# Patient Record
Sex: FEMALE | Race: WHITE | Employment: OTHER | URBAN - METROPOLITAN AREA
[De-identification: names, ages, dates, MRNs, and addresses within clinical notes are randomized per-mention and may not be internally consistent; named-entity substitution may affect disease eponyms.]

---

## 2017-11-08 DIAGNOSIS — J44.9 CHRONIC OBSTRUCTIVE PULMONARY DISEASE, UNSPECIFIED COPD TYPE (HCC): Primary | ICD-10-CM

## 2017-11-09 RX ORDER — ALBUTEROL SULFATE 90 UG/1
2 AEROSOL, METERED RESPIRATORY (INHALATION) EVERY 6 HOURS PRN
Qty: 1 INHALER | Refills: 3 | Status: SHIPPED | OUTPATIENT
Start: 2017-11-09 | End: 2018-02-13 | Stop reason: SDUPTHER

## 2017-11-10 ENCOUNTER — TELEPHONE (OUTPATIENT)
Dept: PULMONOLOGY | Age: 67
End: 2017-11-10

## 2017-12-13 ENCOUNTER — OFFICE VISIT (OUTPATIENT)
Dept: PULMONOLOGY | Age: 67
End: 2017-12-13

## 2017-12-13 VITALS
HEIGHT: 61 IN | TEMPERATURE: 97.4 F | DIASTOLIC BLOOD PRESSURE: 80 MMHG | WEIGHT: 162 LBS | SYSTOLIC BLOOD PRESSURE: 138 MMHG | BODY MASS INDEX: 30.58 KG/M2 | HEART RATE: 128 BPM | OXYGEN SATURATION: 94 %

## 2017-12-13 DIAGNOSIS — F17.200 SMOKING: ICD-10-CM

## 2017-12-13 DIAGNOSIS — J20.9 ACUTE BRONCHITIS, UNSPECIFIED ORGANISM: ICD-10-CM

## 2017-12-13 DIAGNOSIS — J44.9 CHRONIC OBSTRUCTIVE PULMONARY DISEASE, UNSPECIFIED COPD TYPE (HCC): Primary | ICD-10-CM

## 2017-12-13 PROCEDURE — 99214 OFFICE O/P EST MOD 30 MIN: CPT | Performed by: INTERNAL MEDICINE

## 2017-12-13 RX ORDER — ALBUTEROL SULFATE 2.5 MG/3ML
2.5 SOLUTION RESPIRATORY (INHALATION)
COMMUNITY
Start: 2011-04-28 | End: 2018-06-12 | Stop reason: SDUPTHER

## 2017-12-13 RX ORDER — PREDNISONE 10 MG/1
TABLET ORAL
Qty: 40 TABLET | Refills: 0 | Status: SHIPPED | OUTPATIENT
Start: 2017-12-13 | End: 2018-10-26 | Stop reason: ALTCHOICE

## 2017-12-13 RX ORDER — AMOXICILLIN AND CLAVULANATE POTASSIUM 875; 125 MG/1; MG/1
1 TABLET, FILM COATED ORAL 2 TIMES DAILY
Qty: 20 TABLET | Refills: 0 | Status: SHIPPED | OUTPATIENT
Start: 2017-12-13 | End: 2017-12-23

## 2017-12-13 ASSESSMENT — ENCOUNTER SYMPTOMS
COUGH: 1
VOICE CHANGE: 0
ABDOMINAL PAIN: 0
VOMITING: 0
NAUSEA: 0
EYE ITCHING: 0
DIARRHEA: 0
RHINORRHEA: 1
SHORTNESS OF BREATH: 1
CHEST TIGHTNESS: 0
SORE THROAT: 0
WHEEZING: 1
TROUBLE SWALLOWING: 0
EYE DISCHARGE: 0
SINUS PRESSURE: 0

## 2017-12-13 NOTE — PROGRESS NOTES
Subjective:     Chelsi Hopkins is a 75 y.o. female who complains today of:     Chief Complaint   Patient presents with    Follow-up     6 month follow up       HPI  Patient is using nebulizer with albuterol and proair HFA , she said she is not using anoro  C/o shortness of breath Which is more than his usual.  Off and on Wheezing   C/o Cough with white Sputum and having chest congestion. She still smoking 1 pack a day  No Hemoptysis  No Chest pain or pleuritic pain  No Fever or chills. C/o Rhinorrhea no  postnasal drip. Allergies:  Review of patient's allergies indicates no known allergies. Past Medical History:   Diagnosis Date    [facility] [address]     [facility]      History reviewed. No pertinent surgical history. Family History   Problem Relation Age of Onset    Brain Cancer Father      Social History     Social History    Marital status: Single     Spouse name: N/A    Number of children: N/A    Years of education: N/A     Occupational History    Not on file. Social History Main Topics    Smoking status: Current Every Day Smoker     Packs/day: 1.00    Smokeless tobacco: Never Used    Alcohol use Yes      Comment: social    Drug use: No    Sexual activity: Not on file     Other Topics Concern    Not on file     Social History Narrative    No narrative on file         Review of Systems   Constitutional: Negative for chills, diaphoresis, fatigue and fever. HENT: Positive for rhinorrhea. Negative for congestion, mouth sores, nosebleeds, postnasal drip, sinus pressure, sneezing, sore throat, trouble swallowing and voice change. Eyes: Negative for discharge, itching and visual disturbance. Respiratory: Positive for cough, shortness of breath and wheezing. Negative for chest tightness. Cardiovascular: Negative for chest pain, palpitations and leg swelling.    Gastrointestinal: Negative for abdominal pain, diarrhea, nausea and 1 Inhaler 3    albuterol sulfate HFA (PROAIR HFA) 108 (90 Base) MCG/ACT inhaler Inhale 2 puffs into the lungs every 6 hours as needed for Wheezing 1 Inhaler 3     No current facility-administered medications for this visit. Results for orders placed during the hospital encounter of 11/12/16   XR CHEST STANDARD TWO VW    Narrative EXAMINATION: XR CHEST STANDARD TWO VW  CLINICAL HISTORY: R05  COMPARISONS: August 10, 2015  FINDINGS: Frontal and lateral views of the chest were obtained showing new, small perihilar atelectasis on the left. There are signs of underlying COPD. The heart is not enlarged. Mediastinum is not widened. There is no pleural effusion. There is no   dense consolidation. There are no lung masses. There are no worrisome nodules. CONCLUSION: NEW SMALL ATELECTASIS ON THE LEFT     Assessment/Plan:     1. Chronic obstructive pulmonary disease, unspecified COPD type ([address]  Patient is using nebulizer with albuterol and ProAir HFA 4 times a day when necessary basis she is not using Anoro, patient said he is not covered. I'll start her on Bevespi 2 puff BID, sample given. She is having cough with whitish mucus but  have more wheezing and short of breath. Patient does not wants to have a PFT done, chest x-ray requested. 2. Smoking  Patient advised try to quit smoking. Risks related to smoking explained to the patient. 3. Acute bronchitis, unspecified organism  Patient's will be started on Augmentin and taper dose of prednisone. Return in about 6 months (around 6/13/2018) for COPD.       Jessica Boucher MD

## 2018-02-09 DIAGNOSIS — J44.9 CHRONIC OBSTRUCTIVE PULMONARY DISEASE, UNSPECIFIED COPD TYPE (HCC): Primary | ICD-10-CM

## 2018-02-13 ENCOUNTER — TELEPHONE (OUTPATIENT)
Dept: PULMONOLOGY | Age: 68
End: 2018-02-13

## 2018-02-13 DIAGNOSIS — J44.9 CHRONIC OBSTRUCTIVE PULMONARY DISEASE, UNSPECIFIED COPD TYPE (HCC): ICD-10-CM

## 2018-02-13 RX ORDER — ALBUTEROL SULFATE 90 UG/1
2 AEROSOL, METERED RESPIRATORY (INHALATION) EVERY 6 HOURS PRN
Qty: 1 INHALER | Refills: 3 | Status: SHIPPED | OUTPATIENT
Start: 2018-02-13 | End: 2018-05-14 | Stop reason: SDUPTHER

## 2018-05-14 DIAGNOSIS — J44.9 CHRONIC OBSTRUCTIVE PULMONARY DISEASE, UNSPECIFIED COPD TYPE (HCC): ICD-10-CM

## 2018-06-06 ENCOUNTER — HOSPITAL ENCOUNTER (OUTPATIENT)
Dept: GENERAL RADIOLOGY | Age: 68
Discharge: HOME | End: 2018-06-08
Payer: COMMERCIAL

## 2018-06-06 DIAGNOSIS — J44.9 CHRONIC OBSTRUCTIVE PULMONARY DISEASE, UNSPECIFIED COPD TYPE (HCC): ICD-10-CM

## 2018-06-06 PROCEDURE — 71046 X-RAY EXAM CHEST 2 VIEWS: CPT

## 2018-06-12 ENCOUNTER — OFFICE VISIT (OUTPATIENT)
Dept: PULMONOLOGY | Age: 68
End: 2018-06-12
Payer: COMMERCIAL

## 2018-06-12 VITALS
DIASTOLIC BLOOD PRESSURE: 70 MMHG | WEIGHT: 164.8 LBS | SYSTOLIC BLOOD PRESSURE: 110 MMHG | BODY MASS INDEX: 31.11 KG/M2 | HEIGHT: 61 IN | HEART RATE: 105 BPM | OXYGEN SATURATION: 94 % | TEMPERATURE: 97.1 F

## 2018-06-12 DIAGNOSIS — F17.200 SMOKING: ICD-10-CM

## 2018-06-12 DIAGNOSIS — J44.9 CHRONIC OBSTRUCTIVE PULMONARY DISEASE, UNSPECIFIED COPD TYPE (HCC): Primary | ICD-10-CM

## 2018-06-12 DIAGNOSIS — E66.9 OBESITY (BMI 30-39.9): ICD-10-CM

## 2018-06-12 PROCEDURE — 99214 OFFICE O/P EST MOD 30 MIN: CPT | Performed by: INTERNAL MEDICINE

## 2018-06-12 RX ORDER — ALBUTEROL SULFATE 2.5 MG/3ML
2.5 SOLUTION RESPIRATORY (INHALATION) EVERY 6 HOURS PRN
Qty: 120 EACH | Refills: 5 | Status: SHIPPED | OUTPATIENT
Start: 2018-06-12 | End: 2021-12-20 | Stop reason: SDUPTHER

## 2018-06-12 ASSESSMENT — ENCOUNTER SYMPTOMS
CHEST TIGHTNESS: 0
SORE THROAT: 0
RHINORRHEA: 0
EYE DISCHARGE: 0
VOICE CHANGE: 0
WHEEZING: 1
SHORTNESS OF BREATH: 1
TROUBLE SWALLOWING: 0
DIARRHEA: 0
NAUSEA: 0
ABDOMINAL PAIN: 0
VOMITING: 0
EYE ITCHING: 0
COUGH: 1
SINUS PRESSURE: 0

## 2018-07-25 DIAGNOSIS — J44.9 CHRONIC OBSTRUCTIVE PULMONARY DISEASE, UNSPECIFIED COPD TYPE (HCC): ICD-10-CM

## 2018-07-27 DIAGNOSIS — J44.9 CHRONIC OBSTRUCTIVE PULMONARY DISEASE, UNSPECIFIED COPD TYPE (HCC): ICD-10-CM

## 2018-10-24 DIAGNOSIS — J44.9 CHRONIC OBSTRUCTIVE PULMONARY DISEASE, UNSPECIFIED COPD TYPE (HCC): ICD-10-CM

## 2018-10-25 RX ORDER — ALBUTEROL SULFATE 90 UG/1
2 AEROSOL, METERED RESPIRATORY (INHALATION) EVERY 6 HOURS PRN
Qty: 8.5 INHALER | Refills: 3 | Status: SHIPPED | OUTPATIENT
Start: 2018-10-25 | End: 2019-01-15 | Stop reason: SDUPTHER

## 2018-10-26 ENCOUNTER — OFFICE VISIT (OUTPATIENT)
Dept: PULMONOLOGY | Age: 68
End: 2018-10-26
Payer: COMMERCIAL

## 2018-10-26 VITALS
RESPIRATION RATE: 16 BRPM | BODY MASS INDEX: 30.58 KG/M2 | HEIGHT: 61 IN | WEIGHT: 162 LBS | DIASTOLIC BLOOD PRESSURE: 70 MMHG | SYSTOLIC BLOOD PRESSURE: 132 MMHG | OXYGEN SATURATION: 93 % | HEART RATE: 118 BPM | TEMPERATURE: 97.9 F

## 2018-10-26 DIAGNOSIS — Z72.0 TOBACCO ABUSE: ICD-10-CM

## 2018-10-26 DIAGNOSIS — J44.9 CHRONIC OBSTRUCTIVE PULMONARY DISEASE, UNSPECIFIED COPD TYPE (HCC): Primary | ICD-10-CM

## 2018-10-26 DIAGNOSIS — J42 CHRONIC BRONCHITIS, UNSPECIFIED CHRONIC BRONCHITIS TYPE (HCC): ICD-10-CM

## 2018-10-26 PROCEDURE — 99214 OFFICE O/P EST MOD 30 MIN: CPT | Performed by: INTERNAL MEDICINE

## 2018-10-26 ASSESSMENT — ENCOUNTER SYMPTOMS
SINUS PRESSURE: 0
SHORTNESS OF BREATH: 1
COUGH: 1
VOICE CHANGE: 0
RHINORRHEA: 0
CHEST TIGHTNESS: 0
WHEEZING: 1
DIARRHEA: 0
VOMITING: 0
EYE ITCHING: 0
ABDOMINAL PAIN: 0
EYE DISCHARGE: 0
SORE THROAT: 0
TROUBLE SWALLOWING: 0
NAUSEA: 0

## 2018-10-26 NOTE — PROGRESS NOTES
Subjective:     Nicolás Marcelo is a 70 y.o. female who complains today of:     Chief Complaint   Patient presents with    Follow-up     four month f/u for COPD. HPI  Patient is smoking less than 1 ppd  C/o shortness of breath  with exertion. Off and on Wheezing   Cough with white to brown  Sputum  No Hemoptysis  No Chest tightness   No Chest pain with radiation  or pleuritic pain  No Fever or chills. No Rhinorrhea and postnasal drip. She has occasional hear burn , takes Tums  Using bronchodilator with stiolto 2 puff in AM ,she is also using nebulizer with albuterol and albuterol prn use. Allergies:  Patient has no known allergies. Past Medical History:   Diagnosis Date    [facility]     [facility] (chronic obstructive pulmonary disease) ([facility]      History reviewed. No pertinent surgical history. Family History   Problem Relation Age of Onset    Brain Cancer Father      Social History     Social History    Marital status: Single     Spouse name: N/A    Number of children: N/A    Years of education: N/A     Occupational History    Not on file. Social History Main Topics    Smoking status: Current Every Day Smoker     Packs/day: 1.00    Smokeless tobacco: Never Used    Alcohol use Yes      Comment: social    Drug use: No    Sexual activity: Not on file     Other Topics Concern    Not on file     Social History Narrative    No narrative on file         Review of Systems   Constitutional: Negative for chills, diaphoresis, fatigue and fever. HENT: Negative for congestion, mouth sores, nosebleeds, postnasal drip, rhinorrhea, sinus pressure, sneezing, sore throat, trouble swallowing and voice change. Eyes: Negative for discharge, itching and visual disturbance. Respiratory: Positive for cough, shortness of breath and wheezing. Negative for chest tightness. Cardiovascular: Negative for chest pain, palpitations and leg swelling.    Gastrointestinal: Negative for abdominal Rachid Iglesias MD

## 2019-01-15 DIAGNOSIS — J44.9 CHRONIC OBSTRUCTIVE PULMONARY DISEASE, UNSPECIFIED COPD TYPE (HCC): ICD-10-CM

## 2019-01-15 RX ORDER — ALBUTEROL SULFATE 90 UG/1
2 AEROSOL, METERED RESPIRATORY (INHALATION) EVERY 6 HOURS PRN
Qty: 8.5 INHALER | Refills: 3 | Status: CANCELLED | OUTPATIENT
Start: 2019-01-15

## 2019-01-15 RX ORDER — ALBUTEROL SULFATE 90 UG/1
2 AEROSOL, METERED RESPIRATORY (INHALATION) EVERY 6 HOURS PRN
Qty: 8.5 INHALER | Refills: 3 | Status: SHIPPED | OUTPATIENT
Start: 2019-01-15 | End: 2019-02-08 | Stop reason: SDUPTHER

## 2019-03-05 ENCOUNTER — OFFICE VISIT (OUTPATIENT)
Dept: PULMONOLOGY | Age: 69
End: 2019-03-05
Payer: COMMERCIAL

## 2019-03-05 VITALS
BODY MASS INDEX: 30.21 KG/M2 | SYSTOLIC BLOOD PRESSURE: 126 MMHG | HEIGHT: 61 IN | OXYGEN SATURATION: 93 % | HEART RATE: 107 BPM | RESPIRATION RATE: 16 BRPM | WEIGHT: 160 LBS | DIASTOLIC BLOOD PRESSURE: 78 MMHG | TEMPERATURE: 97.7 F

## 2019-03-05 DIAGNOSIS — J44.9 CHRONIC OBSTRUCTIVE PULMONARY DISEASE, UNSPECIFIED COPD TYPE (HCC): Primary | ICD-10-CM

## 2019-03-05 DIAGNOSIS — Z72.0 TOBACCO ABUSE DISORDER: ICD-10-CM

## 2019-03-05 PROCEDURE — 99214 OFFICE O/P EST MOD 30 MIN: CPT | Performed by: INTERNAL MEDICINE

## 2019-03-05 RX ORDER — ALBUTEROL SULFATE 90 UG/1
2 AEROSOL, METERED RESPIRATORY (INHALATION) 4 TIMES DAILY PRN
Qty: 1 INHALER | Refills: 5 | Status: SHIPPED | OUTPATIENT
Start: 2019-03-05 | End: 2019-09-06 | Stop reason: SDUPTHER

## 2019-03-05 ASSESSMENT — ENCOUNTER SYMPTOMS
DIARRHEA: 0
NAUSEA: 0
VOMITING: 0
EYE ITCHING: 0
WHEEZING: 1
COUGH: 1
SORE THROAT: 0
VOICE CHANGE: 0
SINUS PRESSURE: 0
EYE DISCHARGE: 0
SHORTNESS OF BREATH: 1
TROUBLE SWALLOWING: 0
ABDOMINAL PAIN: 0
RHINORRHEA: 0
CHEST TIGHTNESS: 0

## 2019-04-08 ENCOUNTER — TELEPHONE (OUTPATIENT)
Dept: PULMONOLOGY | Age: 69
End: 2019-04-08

## 2019-05-01 DIAGNOSIS — J44.9 CHRONIC OBSTRUCTIVE PULMONARY DISEASE, UNSPECIFIED COPD TYPE (HCC): ICD-10-CM

## 2019-05-24 ENCOUNTER — TELEPHONE (OUTPATIENT)
Dept: PULMONOLOGY | Age: 69
End: 2019-05-24

## 2019-05-24 NOTE — TELEPHONE ENCOUNTER
PT CALLED STATING THAT STIOLTO IS NOT COVERED BY INSURANCE, SHE TRIED [employer] BUT DID NOT WORK FOR HER. INSURANCE WILL COVERED COMBIVENT AND BREO. PLEASE ADVICE.

## 2019-08-24 ENCOUNTER — HOSPITAL ENCOUNTER (OUTPATIENT)
Dept: GENERAL RADIOLOGY | Age: 69
Discharge: HOME | End: 2019-08-26
Payer: COMMERCIAL

## 2019-08-24 DIAGNOSIS — J44.9 CHRONIC OBSTRUCTIVE PULMONARY DISEASE, UNSPECIFIED COPD TYPE (HCC): ICD-10-CM

## 2019-08-24 PROCEDURE — 71046 X-RAY EXAM CHEST 2 VIEWS: CPT

## 2019-09-06 ENCOUNTER — OFFICE VISIT (OUTPATIENT)
Dept: PULMONOLOGY | Age: 69
End: 2019-09-06
Payer: COMMERCIAL

## 2019-09-06 VITALS
HEIGHT: 61 IN | TEMPERATURE: 97.3 F | SYSTOLIC BLOOD PRESSURE: 120 MMHG | WEIGHT: 160 LBS | BODY MASS INDEX: 30.21 KG/M2 | DIASTOLIC BLOOD PRESSURE: 80 MMHG | OXYGEN SATURATION: 92 % | RESPIRATION RATE: 16 BRPM | HEART RATE: 106 BPM

## 2019-09-06 DIAGNOSIS — R09.02 HYPOXIA: ICD-10-CM

## 2019-09-06 DIAGNOSIS — Z72.0 TOBACCO ABUSE DISORDER: ICD-10-CM

## 2019-09-06 DIAGNOSIS — J44.9 CHRONIC OBSTRUCTIVE PULMONARY DISEASE, UNSPECIFIED COPD TYPE (HCC): Primary | ICD-10-CM

## 2019-09-06 PROCEDURE — 99214 OFFICE O/P EST MOD 30 MIN: CPT | Performed by: INTERNAL MEDICINE

## 2019-09-06 RX ORDER — PREDNISONE 10 MG/1
TABLET ORAL
Qty: 40 TABLET | Refills: 0 | Status: SHIPPED | OUTPATIENT
Start: 2019-09-06 | End: 2020-07-27 | Stop reason: ALTCHOICE

## 2019-09-06 RX ORDER — ALBUTEROL SULFATE 90 UG/1
2 AEROSOL, METERED RESPIRATORY (INHALATION) 4 TIMES DAILY PRN
Qty: 3 INHALER | Refills: 1 | Status: SHIPPED | OUTPATIENT
Start: 2019-09-06 | End: 2020-02-20

## 2019-09-06 RX ORDER — AZITHROMYCIN 500 MG/1
500 TABLET, FILM COATED ORAL DAILY
Qty: 1 PACKET | Refills: 0 | Status: SHIPPED | OUTPATIENT
Start: 2019-09-06 | End: 2019-09-09

## 2019-09-06 ASSESSMENT — ENCOUNTER SYMPTOMS
SHORTNESS OF BREATH: 1
SINUS PRESSURE: 0
ABDOMINAL PAIN: 0
TROUBLE SWALLOWING: 0
VOMITING: 0
COUGH: 1
DIARRHEA: 0
RHINORRHEA: 0
WHEEZING: 1
EYE ITCHING: 0
EYE DISCHARGE: 0
CHEST TIGHTNESS: 0
SORE THROAT: 0
VOICE CHANGE: 0
NAUSEA: 0

## 2019-09-06 NOTE — PROGRESS NOTES
Active member of club or organization: Not on file     Attends meetings of clubs or organizations: Not on file     Relationship status: Not on file    Intimate partner violence:     Fear of current or ex partner: Not on file     Emotionally abused: Not on file     Physically abused: Not on file     Forced sexual activity: Not on file   Other Topics Concern    Not on file   Social History Narrative    Not on file         Review of Systems   Constitutional: Negative for chills, diaphoresis, fatigue and fever. HENT: Negative for congestion, mouth sores, nosebleeds, postnasal drip, rhinorrhea, sinus pressure, sneezing, sore throat, trouble swallowing and voice change. Eyes: Negative for discharge, itching and visual disturbance. Respiratory: Positive for cough, shortness of breath and wheezing. Negative for chest tightness. Cardiovascular: Negative for chest pain, palpitations and leg swelling. Gastrointestinal: Negative for abdominal pain, diarrhea, nausea and vomiting. Genitourinary: Negative for difficulty urinating and hematuria. Musculoskeletal: Negative for arthralgias, joint swelling and myalgias. Skin: Negative for rash. Allergic/Immunologic: Negative for environmental allergies and food allergies. Neurological: Negative for dizziness, tremors, weakness and headaches. Psychiatric/Behavioral: Negative for behavioral problems and sleep disturbance. Objective:     Vitals:    09/06/19 0836 09/06/19 0843   BP: 120/80    Pulse: 106    Resp: 16    Temp: 97.3 °F (36.3 °C)    TempSrc: Tympanic    SpO2: (!) 86% 92%   Weight: 160 lb (72.6 kg)    Height: 5' 1\" (1.549 m)          Physical Exam   Constitutional: She is oriented to person, place, and time. She appears well-developed and well-nourished. No distress. HENT:   Head: Normocephalic and atraumatic. Nose: Nose normal.   Mouth/Throat: Oropharynx is clear and moist.   Eyes: Pupils are equal, round, and reactive to light.  EOM and soft tissues intact. No change from June 6, 2018. Impression NO ACTIVE DISEASE IN THE CHEST. Assessment/Plan:     1. Chronic obstructive pulmonary disease, unspecified COPD type ([address]  Patient is having short of breath with exertion. She thinks shortness of breath is somewhat more than last few days and having wheezing. and cough with creamy sputum. Chest x-ray done shows no active infiltration. using combivent respimat 1 puff QID she is also using nebulizer with albuterol and albuterol prn use.  pro air HFA. She will be started on taper dose of prednisone and p.o. antibiotic. - predniSONE (DELTASONE) 10 MG tablet; 4 tablets daily for 4 days, 3 tablets daily for 4 days, 2 tablets daily for 4 days, then 1 tablet daily for 4 days  Dispense: 40 tablet; Refill: 0  - azithromycin (ZITHROMAX) 500 MG tablet; Take 1 tablet by mouth daily for 3 days  Dispense: 1 packet; Refill: 0    2. Tobacco abuse disorder  Patient advised try to quit smoking. Risks related to smoking explained to the patient. Different ways to help him quit smoking were discussed today. Explained about CT chest for lung screening. She refused to have a CT chest done    3. Hypoxia  He has low O2 saturation 82% on room which improved to 92% with few minute of rest.  I explained patient     Return in about 6 months (around 3/6/2020) for COPD.       Delmar Martin MD

## 2019-10-18 ENCOUNTER — TELEPHONE (OUTPATIENT)
Dept: PULMONOLOGY | Age: 69
End: 2019-10-18

## 2019-10-19 RX ORDER — PREDNISONE 10 MG/1
TABLET ORAL
Qty: 40 TABLET | Refills: 0 | Status: SHIPPED | OUTPATIENT
Start: 2019-10-19 | End: 2020-07-27 | Stop reason: ALTCHOICE

## 2019-12-03 ENCOUNTER — TELEPHONE (OUTPATIENT)
Dept: PULMONOLOGY | Age: 69
End: 2019-12-03

## 2019-12-03 DIAGNOSIS — J44.9 CHRONIC OBSTRUCTIVE PULMONARY DISEASE, UNSPECIFIED COPD TYPE (HCC): Primary | ICD-10-CM

## 2019-12-03 RX ORDER — PREDNISONE 10 MG/1
TABLET ORAL
Qty: 40 TABLET | Refills: 0 | Status: SHIPPED | OUTPATIENT
Start: 2019-12-03 | End: 2020-07-27 | Stop reason: ALTCHOICE

## 2019-12-03 RX ORDER — AZITHROMYCIN 250 MG/1
250 TABLET, FILM COATED ORAL SEE ADMIN INSTRUCTIONS
Qty: 6 TABLET | Refills: 0 | Status: SHIPPED | OUTPATIENT
Start: 2019-12-03 | End: 2019-12-08

## 2020-02-20 RX ORDER — ALBUTEROL SULFATE 90 UG/1
2 AEROSOL, METERED RESPIRATORY (INHALATION) 4 TIMES DAILY PRN
Qty: 6.7 INHALER | Refills: 5 | Status: SHIPPED | OUTPATIENT
Start: 2020-02-20 | End: 2020-07-27 | Stop reason: SDUPTHER

## 2020-03-19 ENCOUNTER — TELEPHONE (OUTPATIENT)
Dept: PULMONOLOGY | Age: 70
End: 2020-03-19

## 2020-03-19 NOTE — TELEPHONE ENCOUNTER
Patient was given Ipratropium bromide albuterol inhalation solution after discharge from [facility] and would like a refill on it.  It's not on medication list. Please Advise [facility] ann-marie Nuno

## 2020-03-24 ENCOUNTER — VIRTUAL VISIT (OUTPATIENT)
Dept: PULMONOLOGY | Age: 70
End: 2020-03-24
Payer: COMMERCIAL

## 2020-03-24 PROCEDURE — 99214 OFFICE O/P EST MOD 30 MIN: CPT | Performed by: INTERNAL MEDICINE

## 2020-03-24 RX ORDER — IPRATROPIUM BROMIDE AND ALBUTEROL SULFATE 2.5; .5 MG/3ML; MG/3ML
1 SOLUTION RESPIRATORY (INHALATION) EVERY 4 HOURS
Qty: 120 ML | Refills: 3 | Status: SHIPPED | OUTPATIENT
Start: 2020-03-24 | End: 2020-07-27 | Stop reason: SDUPTHER

## 2020-03-24 ASSESSMENT — ENCOUNTER SYMPTOMS
SINUS PRESSURE: 0
TROUBLE SWALLOWING: 0
EYE DISCHARGE: 0
RHINORRHEA: 0
ABDOMINAL PAIN: 0
VOMITING: 0
DIARRHEA: 0
WHEEZING: 0
VOICE CHANGE: 0
COUGH: 1
NAUSEA: 0
EYE ITCHING: 0
SORE THROAT: 0
SHORTNESS OF BREATH: 0
CHEST TIGHTNESS: 0

## 2020-03-24 NOTE — PROGRESS NOTES
10 MG tablet 4 tablets daily for 4 days, 3 tablets daily for 4 days, 2 tablets daily for 4 days, then 1 tablet daily for 4 days 40 tablet 0    predniSONE (DELTASONE) 10 MG tablet 4 tablets daily for 4 days, 3 tablets daily for 4 days, 2 tablets daily for 4 days, then 1 tablet daily for 4 days 40 tablet 0    predniSONE (DELTASONE) 10 MG tablet 4 tablets daily for 4 days, 3 tablets daily for 4 days, 2 tablets daily for 4 days, then 1 tablet daily for 4 days 40 tablet 0    albuterol (PROVENTIL) (2.5 MG/3ML) 0.083% nebulizer solution Take 3 mLs by nebulization every 6 hours as needed for Wheezing 120 each 5     No current facility-administered medications for this visit. Results for orders placed during the hospital encounter of 08/24/19   XR CHEST STANDARD (2 VW)    Narrative EXAMINATION: XR CHEST (2 VW)    REASON FOR EXAM:J44.9 Chronic obstructive pulmonary disease, unspecified COPD type ([address] ICD10    FINDINGS: Plain films reveal heart and mediastinal structures within normal limits and the lung fields free of active disease. Bones and soft tissues intact. No change from June 6, 2018. Impression NO ACTIVE DISEASE IN THE CHEST. Assessment/Plan:     1. Chronic obstructive pulmonary disease, unspecified COPD type ([address]  She  is using nebulizer albuterol and ipatropium, combivent respimat, albuterol HFANo C/o shortness of breath with exertion. No Wheezing. C/o Cough with clear Sputum. She said she is not using oxygen anymore. She wants refill on nebulizer solution. - ipratropium-albuterol (DUONEB) 0.5-2.5 (3) MG/3ML SOLN nebulizer solution; Inhale 3 mLs into the lungs every 4 hours  Dispense: 120 mL; Refill: 3    2. Tobacco abuse disorder  Patient advised try to quit smoking. Risks related to smoking explained to the patient. Different ways to help him quit smoking were discussed today.     Patient notified that this is a billable service and has given verbal consent for telehealth services. Time spent with patient 24  minutes. Return in about 3 months (around 6/24/2020) for COPD.       Ole Prasad MD

## 2020-06-26 RX ORDER — ALBUTEROL SULFATE 90 UG/1
2 AEROSOL, METERED RESPIRATORY (INHALATION) 4 TIMES DAILY PRN
Qty: 6.7 INHALER | Refills: 5 | OUTPATIENT
Start: 2020-06-26

## 2020-06-26 RX ORDER — IPRATROPIUM BROMIDE AND ALBUTEROL SULFATE 2.5; .5 MG/3ML; MG/3ML
1 SOLUTION RESPIRATORY (INHALATION) EVERY 4 HOURS
Qty: 120 ML | Refills: 3 | OUTPATIENT
Start: 2020-06-26

## 2020-07-27 ENCOUNTER — OFFICE VISIT (OUTPATIENT)
Dept: PULMONOLOGY | Age: 70
End: 2020-07-27
Payer: COMMERCIAL

## 2020-07-27 VITALS
WEIGHT: 158 LBS | OXYGEN SATURATION: 92 % | BODY MASS INDEX: 29.83 KG/M2 | HEIGHT: 61 IN | HEART RATE: 117 BPM | TEMPERATURE: 97.3 F | DIASTOLIC BLOOD PRESSURE: 74 MMHG | RESPIRATION RATE: 16 BRPM | SYSTOLIC BLOOD PRESSURE: 112 MMHG

## 2020-07-27 PROCEDURE — 99214 OFFICE O/P EST MOD 30 MIN: CPT | Performed by: INTERNAL MEDICINE

## 2020-07-27 RX ORDER — IPRATROPIUM BROMIDE AND ALBUTEROL SULFATE 2.5; .5 MG/3ML; MG/3ML
1 SOLUTION RESPIRATORY (INHALATION) EVERY 4 HOURS
Qty: 120 ML | Refills: 3 | Status: SHIPPED | OUTPATIENT
Start: 2020-07-27 | End: 2020-09-11 | Stop reason: SDUPTHER

## 2020-07-27 RX ORDER — ALBUTEROL SULFATE 90 UG/1
2 AEROSOL, METERED RESPIRATORY (INHALATION) 4 TIMES DAILY PRN
Qty: 6.7 INHALER | Refills: 5 | Status: SHIPPED | OUTPATIENT
Start: 2020-07-27 | End: 2020-09-11 | Stop reason: SDUPTHER

## 2020-07-27 ASSESSMENT — ENCOUNTER SYMPTOMS
RHINORRHEA: 0
COUGH: 1
SORE THROAT: 0
SINUS PRESSURE: 0
EYE DISCHARGE: 0
ABDOMINAL PAIN: 0
VOMITING: 0
CHEST TIGHTNESS: 0
SHORTNESS OF BREATH: 1
EYE ITCHING: 0
VOICE CHANGE: 0
WHEEZING: 0
DIARRHEA: 0
NAUSEA: 0
TROUBLE SWALLOWING: 0

## 2020-07-27 NOTE — PROGRESS NOTES
Subjective:     Joe Concepcion is a 69 y.o. female who complains today of:     Chief Complaint   Patient presents with    Follow-up     three month f/u for COPD. HPI  C/o mild shortness of breath  with exertion, hot humid weather. No Wheezing   C/o Cough with white  Sputum  No Hemoptysis  No Chest tightness   No Chest pain with radiation  or pleuritic pain  No Fever or chills. No Rhinorrhea and postnasal drip. Using bronchodilator with nebulizer albuterol and ipatropium, combivent respimat, albuterol HFA  She is still smoking 1 ppd        Allergies:  Patient has no known allergies. Past Medical History:   Diagnosis Date    Chronic bronchitis (HCC)     [facility] [address]     Lung disease      No past surgical history on file.   Family History   Problem Relation Age of Onset    Brain Cancer Father      Social History     Socioeconomic History    Marital status: Single     Spouse name: Not on file    Number of children: Not on file    Years of education: Not on file    Highest education level: Not on file   Occupational History    Not on file   Social Needs    Financial resource strain: Not on file    Food insecurity     Worry: Not on file     Inability: Not on file    Transportation needs     Medical: Not on file     Non-medical: Not on file   Tobacco Use    Smoking status: Current Every Day Smoker     Packs/day: 1.00    Smokeless tobacco: Never Used   Substance and Sexual Activity    Alcohol use: Yes     Comment: social    Drug use: No    Sexual activity: Not on file   Lifestyle    Physical activity     Days per week: Not on file     Minutes per session: Not on file    Stress: Not on file   Relationships    Social connections     Talks on phone: Not on file     Gets together: Not on file     Attends [employer] service: Not on file     Active member of club or organization: Not on file     Attends meetings of clubs or organizations: Not on file Thyroid: No thyromegaly. Vascular: No JVD. Trachea: No tracheal deviation. Cardiovascular:      Rate and Rhythm: Normal rate and regular rhythm. Heart sounds: No murmur. No friction rub. No gallop. Pulmonary:      Effort: No respiratory distress. Breath sounds: No wheezing or rales. Comments: diminished Breath sound bilaterally. Chest:      Chest wall: No tenderness. Abdominal:      General: There is no distension. Tenderness: There is no abdominal tenderness. There is no rebound. Musculoskeletal: Normal range of motion. Lymphadenopathy:      Cervical: No cervical adenopathy. Skin:     General: Skin is warm and dry. Neurological:      Mental Status: She is alert and oriented to person, place, and time. Coordination: Coordination normal.         Current Outpatient Medications   Medication Sig Dispense Refill    albuterol-ipratropium (COMBIVENT RESPIMAT)  MCG/ACT AERS inhaler Inhale 1 puff into the lungs every 6 hours 3 Inhaler 1    ipratropium-albuterol (DUONEB) 0.5-2.5 (3) MG/3ML SOLN nebulizer solution Inhale 3 mLs into the lungs every 4 hours 120 mL 3    albuterol sulfate  (90 Base) MCG/ACT inhaler Inhale 2 puffs into the lungs 4 times daily as needed for Wheezing 6.7 Inhaler 5    albuterol (PROVENTIL) (2.5 MG/3ML) 0.083% nebulizer solution Take 3 mLs by nebulization every 6 hours as needed for Wheezing 120 each 5     No current facility-administered medications for this visit. Results for orders placed during the hospital encounter of 08/24/19   XR CHEST STANDARD (2 VW)    Narrative EXAMINATION: XR CHEST (2 VW)    REASON FOR EXAM:J44.9 Chronic obstructive pulmonary disease, unspecified COPD type ([facility] [address] ICD10    FINDINGS: Plain films reveal heart and mediastinal structures within normal limits and the lung fields free of active disease. Bones and soft tissues intact. No change from June 6, 2018.        Impression NO ACTIVE DISEASE IN THE CHEST.        Assessment/Plan:     1. Chronic obstructive pulmonary disease, unspecified COPD type ([facility] [address]  C/o mild shortness of breath  with exertion, hot humid weather. No Wheezing   C/o Cough with white  Sputum. Using bronchodilator with nebulizer albuterol and ipatropium, combivent respimat, albuterol HFA. continue bronchodilator therapy as before. - Spirometry With Bronchodilator; Future  - XR CHEST STANDARD (2 VW); Future    - albuterol-ipratropium (COMBIVENT RESPIMAT)  MCG/ACT AERS inhaler; Inhale 1 puff into the lungs every 6 hours  Dispense: 3 Inhaler; Refill: 1  - ipratropium-albuterol (DUONEB) 0.5-2.5 (3) MG/3ML SOLN nebulizer solution; Inhale 3 mLs into the lungs every 4 hours  Dispense: 120 mL; Refill: 3    2. Tobacco abuse disorder  Patient advised try to quit smoking. Risks related to smoking explained to the patient. Different ways to help him quit smoking were discussed today. She is still smoking 1 ppd.    3. Hypoxia  She has O2 sat 88% qualify for O2 . Refuse O2    Return in about 3 months (around 10/27/2020) for COPD.       Anson Ambrose MD

## 2020-08-20 PROCEDURE — 71046 X-RAY EXAM CHEST 2 VIEWS: CPT

## 2020-08-21 ENCOUNTER — HOSPITAL ENCOUNTER (OUTPATIENT)
Dept: GENERAL RADIOLOGY | Age: 70
Discharge: HOME | End: 2020-08-22
Payer: COMMERCIAL

## 2020-08-21 PROCEDURE — 71046 X-RAY EXAM CHEST 2 VIEWS: CPT

## 2020-09-11 ENCOUNTER — HOSPITAL ENCOUNTER (OUTPATIENT)
Dept: PULMONOLOGY | Age: 70
Discharge: HOME | End: 2020-09-11
Payer: MEDICARE

## 2020-09-11 PROCEDURE — 94060 EVALUATION OF WHEEZING: CPT

## 2020-09-11 PROCEDURE — 6360000002 HC RX W HCPCS: Performed by: INTERNAL MEDICINE

## 2020-09-11 PROCEDURE — 94060 EVALUATION OF WHEEZING: CPT | Performed by: INTERNAL MEDICINE

## 2020-09-11 RX ORDER — IPRATROPIUM BROMIDE AND ALBUTEROL SULFATE 2.5; .5 MG/3ML; MG/3ML
1 SOLUTION RESPIRATORY (INHALATION) EVERY 4 HOURS
Qty: 120 ML | Refills: 3 | Status: SHIPPED | OUTPATIENT
Start: 2020-09-11 | End: 2021-08-02 | Stop reason: SDUPTHER

## 2020-09-11 RX ORDER — ALBUTEROL SULFATE 2.5 MG/3ML
2.5 SOLUTION RESPIRATORY (INHALATION) ONCE
Status: COMPLETED | OUTPATIENT
Start: 2020-09-11 | End: 2020-09-11

## 2020-09-11 RX ORDER — ALBUTEROL SULFATE 90 UG/1
2 AEROSOL, METERED RESPIRATORY (INHALATION) 4 TIMES DAILY PRN
Qty: 6.7 INHALER | Refills: 5 | Status: SHIPPED | OUTPATIENT
Start: 2020-09-11 | End: 2021-06-01

## 2020-09-11 RX ADMIN — ALBUTEROL SULFATE 2.5 MG: 2.5 SOLUTION RESPIRATORY (INHALATION) at 08:42

## 2020-09-15 NOTE — PROCEDURES
Spirometry only was done on this 69year-old patient who's height is 60 inches and weight is 158 pounds with 53-year smoking history    Indications  Dyspnea and cough    Spirometry  FVC is 1.23 liters which is 49 % of predicted, FEV1 is 0.89 liters which is 47 % of predicted, FEV1/FVC ratio is 72% which is mildly decreased  Flow volume loop suggest an obstructive pattern, FEF 25-75% is 0.52 L/Sec, which is 32 % of predicted  Following bronchodilator therapy forced vital capacity improved by 13%, FEV1 improved by 10%, FEF 25-75% improved by 57%      Overall impression  Study suggest at least moderate degree of obstructive impairment with mild reversibility after bronchodilators    Electronically signed by Katelin Linares MD, [facility] on 9/15/2020 at 10:01 AM

## 2020-10-26 ENCOUNTER — TELEPHONE (OUTPATIENT)
Dept: PULMONOLOGY | Age: 70
End: 2020-10-26

## 2020-10-26 NOTE — TELEPHONE ENCOUNTER
Patient called to request that her last RX order be resubmitted. As they say they haven't received her order. I' ts from September 18 I believe.

## 2020-10-29 ENCOUNTER — OFFICE VISIT (OUTPATIENT)
Dept: PULMONOLOGY | Age: 70
End: 2020-10-29
Payer: MEDICARE

## 2020-10-29 VITALS
SYSTOLIC BLOOD PRESSURE: 126 MMHG | BODY MASS INDEX: 28.51 KG/M2 | OXYGEN SATURATION: 90 % | DIASTOLIC BLOOD PRESSURE: 72 MMHG | RESPIRATION RATE: 16 BRPM | WEIGHT: 151 LBS | HEIGHT: 61 IN | HEART RATE: 114 BPM | TEMPERATURE: 97.1 F

## 2020-10-29 PROCEDURE — G8417 CALC BMI ABV UP PARAM F/U: HCPCS | Performed by: INTERNAL MEDICINE

## 2020-10-29 PROCEDURE — 4004F PT TOBACCO SCREEN RCVD TLK: CPT | Performed by: INTERNAL MEDICINE

## 2020-10-29 PROCEDURE — 1123F ACP DISCUSS/DSCN MKR DOCD: CPT | Performed by: INTERNAL MEDICINE

## 2020-10-29 PROCEDURE — 99214 OFFICE O/P EST MOD 30 MIN: CPT | Performed by: INTERNAL MEDICINE

## 2020-10-29 PROCEDURE — G8427 DOCREV CUR MEDS BY ELIG CLIN: HCPCS | Performed by: INTERNAL MEDICINE

## 2020-10-29 PROCEDURE — 3017F COLORECTAL CA SCREEN DOC REV: CPT | Performed by: INTERNAL MEDICINE

## 2020-10-29 PROCEDURE — G8400 PT W/DXA NO RESULTS DOC: HCPCS | Performed by: INTERNAL MEDICINE

## 2020-10-29 PROCEDURE — 1090F PRES/ABSN URINE INCON ASSESS: CPT | Performed by: INTERNAL MEDICINE

## 2020-10-29 PROCEDURE — 4040F PNEUMOC VAC/ADMIN/RCVD: CPT | Performed by: INTERNAL MEDICINE

## 2020-10-29 PROCEDURE — G8484 FLU IMMUNIZE NO ADMIN: HCPCS | Performed by: INTERNAL MEDICINE

## 2020-10-29 PROCEDURE — G8926 SPIRO NO PERF OR DOC: HCPCS | Performed by: INTERNAL MEDICINE

## 2020-10-29 PROCEDURE — 3023F SPIROM DOC REV: CPT | Performed by: INTERNAL MEDICINE

## 2020-10-29 ASSESSMENT — ENCOUNTER SYMPTOMS
EYE DISCHARGE: 0
SINUS PRESSURE: 0
VOICE CHANGE: 0
VOMITING: 0
RHINORRHEA: 0
TROUBLE SWALLOWING: 0
WHEEZING: 1
CHEST TIGHTNESS: 0
EYE ITCHING: 0
DIARRHEA: 0
COUGH: 1
SHORTNESS OF BREATH: 1
ABDOMINAL PAIN: 0
NAUSEA: 0
SORE THROAT: 0

## 2020-10-29 NOTE — PROGRESS NOTES
Subjective:     Vinnie Caldera is a 69 y.o. female who complains today of:     Chief Complaint   Patient presents with    Follow-up     three month f/u for CXR and Spirometry results. HPI  Patient is on is using bronchodilator with nebulizer albuterol and ipatropium, combivent respimat, albuterol HFA  She had CXR and PFT done . She retired on 5/20   C/o shortness of breath  with exertion and weather change . Occasional Wheezing   C/o Cough with  Clear Sputum  No Hemoptysis  No Chest tightness   No Chest pain with radiation  or pleuritic pain  No Fever or chills. No Rhinorrhea and postnasal drip. She is still smoking 1 ppd    Allergies:  Patient has no known allergies. Past Medical History:   Diagnosis Date    Chronic bronchitis (HCC)     COPD (chronic obstructive pulmonary disease) ([facility] [address]     Lung disease      No past surgical history on file.   Family History   Problem Relation Age of Onset    Brain Cancer Father      Social History     Socioeconomic History    Marital status: Single     Spouse name: Not on file    Number of children: Not on file    Years of education: Not on file    Highest education level: Not on file   Occupational History    Not on file   Social Needs    Financial resource strain: Not on file    Food insecurity     Worry: Not on file     Inability: Not on file    Transportation needs     Medical: Not on file     Non-medical: Not on file   Tobacco Use    Smoking status: Current Every Day Smoker     Packs/day: 1.00    Smokeless tobacco: Never Used   Substance and Sexual Activity    Alcohol use: Yes     Comment: social    Drug use: No    Sexual activity: Not on file   Lifestyle    Physical activity     Days per week: Not on file     Minutes per session: Not on file    Stress: Not on file   Relationships    Social connections     Talks on phone: Not on file     Gets together: Not on file     Attends Restorationism service: Not on file     Active member of club or organization: Not on file     Attends meetings of clubs or organizations: Not on file     Relationship status: Not on file    Intimate partner violence     Fear of current or ex partner: Not on file     Emotionally abused: Not on file     Physically abused: Not on file     Forced sexual activity: Not on file   Other Topics Concern    Not on file   Social History Narrative    Not on file         Review of Systems   Constitutional: Negative for chills, diaphoresis, fatigue and fever. HENT: Negative for congestion, mouth sores, nosebleeds, postnasal drip, rhinorrhea, sinus pressure, sneezing, sore throat, trouble swallowing and voice change. Eyes: Negative for discharge, itching and visual disturbance. Respiratory: Positive for cough, shortness of breath and wheezing. Negative for chest tightness. Cardiovascular: Negative for chest pain, palpitations and leg swelling. Gastrointestinal: Negative for abdominal pain, diarrhea, nausea and vomiting. Genitourinary: Negative for difficulty urinating and hematuria. Musculoskeletal: Negative for arthralgias, joint swelling and myalgias. Skin: Negative for rash. Allergic/Immunologic: Negative for environmental allergies and food allergies. Neurological: Negative for dizziness, tremors, weakness and headaches. Psychiatric/Behavioral: Negative for behavioral problems and sleep disturbance.         :     Vitals:    10/29/20 0841   BP: 126/72   Pulse: 114   Resp: 16   Temp: 97.1 °F (36.2 °C)   TempSrc: Temporal   SpO2: 90%   Weight: 151 lb (68.5 kg)   Height: 5' 1\" (1.549 m)     Wt Readings from Last 3 Encounters:   10/29/20 151 lb (68.5 kg)   07/27/20 158 lb (71.7 kg)   09/06/19 160 lb (72.6 kg)         Physical Exam  Constitutional:       General: She is not in acute distress. Appearance: She is well-developed. She is not diaphoretic. HENT:      Head: Normocephalic and atraumatic.       Nose: Nose normal.   Eyes:      Pupils: Pupils are equal, round, and reactive to light. Neck:      Thyroid: No thyromegaly. Vascular: No JVD. Trachea: No tracheal deviation. Cardiovascular:      Rate and Rhythm: Normal rate and regular rhythm. Heart sounds: No murmur. No friction rub. No gallop. Pulmonary:      Effort: No respiratory distress. Breath sounds: No wheezing or rales. Comments: diminished Breath sound bilaterally. Chest:      Chest wall: No tenderness. Abdominal:      General: There is no distension. Tenderness: There is no abdominal tenderness. There is no rebound. Musculoskeletal: Normal range of motion. Lymphadenopathy:      Cervical: No cervical adenopathy. Skin:     General: Skin is warm and dry. Neurological:      Mental Status: She is alert and oriented to person, place, and time. Coordination: Coordination normal.         Current Outpatient Medications   Medication Sig Dispense Refill    albuterol sulfate  (90 Base) MCG/ACT inhaler Inhale 2 puffs into the lungs 4 times daily as needed for Wheezing 6.7 Inhaler 5    albuterol-ipratropium (COMBIVENT RESPIMAT)  MCG/ACT AERS inhaler Inhale 1 puff into the lungs every 6 hours 3 Inhaler 1    ipratropium-albuterol (DUONEB) 0.5-2.5 (3) MG/3ML SOLN nebulizer solution Inhale 3 mLs into the lungs every 4 hours 120 mL 3    albuterol (PROVENTIL) (2.5 MG/3ML) 0.083% nebulizer solution Take 3 mLs by nebulization every 6 hours as needed for Wheezing 120 each 5     No current facility-administered medications for this visit.     Fredrick Byrd MD     9/15/2020 10:33 AM   Spirometry only was done on this 69year-old patient who's height   is 60 inches and weight is 158 pounds with 53-year smoking   history     Indications   Dyspnea and cough     Spirometry   FVC is 1.23 liters which is 49 % of predicted, FEV1 is 0.89   liters which is 47 % of predicted, FEV1/FVC ratio is 72% which is   mildly decreased   Flow volume loop suggest an obstructive pattern, FEF 25-75% is   0.52 L/Sec, which is 32 % of predicted   Following bronchodilator therapy forced vital capacity improved   by 13%, FEV1 improved by 10%, FEF 25-75% improved by 57%       Overall impression   Study suggest at least moderate degree of obstructive impairment   with mild reversibility after bronchodilators     Electronically signed by Delia Verde MD, FCCP on 9/15/2020 at     Results for orders placed during the hospital encounter of 08/20/20   XR CHEST (2 VW)    Narrative X-RAY: A CHEST, 2 VIEW(S):       CLINICAL HISTORY:  J44.9 Chronic obstructive pulmonary disease, unspecified COPD type ([address] ICD10  , no complaints today. DATE: 8/20/2020 8:10 AM    COMPARISONS: 8/24/2019    FINDINGS: Normal cardiac silhouette. There are atherosclerotic calcifications in the aortic arch. Lungs are clear without consolidation. No pleural effusion or pneumothorax. There are mild degenerative changes in spine. Impression NO ACUTE CARDIOPULMONARY ABNORMALITY. NO CHANGE. Assessment/Plan:     1. Chronic obstructive pulmonary disease, unspecified COPD type ([address]  She is on is using bronchodilator with nebulizer albuterol and ipatropium, combivent respimat, albuterol HFA  She had CXR and PFT done . PFT  Show severe COPD. CXR show no active disease  C/o shortness of breath  with exertion and weather change . Occasional Wheezing C/o Cough with  Clear Sputum. 2. Tobacco abuse disorder  Patient advised try to quit smoking. Risks related to smoking explained to the patient. Different ways to help him quit smoking were discussed today. She is still smoking 1 ppd. She does not want CT chest  Lung at this vist.     3. Hypoxia  She has O2 Spo2 90% , she does not want to evaluation for O2. She said it goes down to 89 but goes up to 94%      Return in about 4 months (around 2/28/2021) for COPD.       Renee Montoya MD no change

## 2021-03-25 DIAGNOSIS — J44.9 CHRONIC OBSTRUCTIVE PULMONARY DISEASE, UNSPECIFIED COPD TYPE (HCC): ICD-10-CM

## 2021-03-26 RX ORDER — IPRATROPIUM/ALBUTEROL SULFATE 20-100 MCG
MIST INHALER (GRAM) INHALATION
Qty: 12 G | Refills: 3 | Status: SHIPPED | OUTPATIENT
Start: 2021-03-26 | End: 2021-12-20 | Stop reason: SDUPTHER

## 2021-03-31 ENCOUNTER — OFFICE VISIT (OUTPATIENT)
Dept: PULMONOLOGY | Age: 71
End: 2021-03-31
Payer: MEDICARE

## 2021-03-31 VITALS
BODY MASS INDEX: 29.27 KG/M2 | HEART RATE: 122 BPM | WEIGHT: 155 LBS | OXYGEN SATURATION: 94 % | DIASTOLIC BLOOD PRESSURE: 99 MMHG | TEMPERATURE: 98.3 F | HEIGHT: 61 IN | SYSTOLIC BLOOD PRESSURE: 143 MMHG | RESPIRATION RATE: 18 BRPM

## 2021-03-31 DIAGNOSIS — J44.9 CHRONIC OBSTRUCTIVE PULMONARY DISEASE, UNSPECIFIED COPD TYPE (HCC): Primary | ICD-10-CM

## 2021-03-31 DIAGNOSIS — R09.02 HYPOXIA: ICD-10-CM

## 2021-03-31 DIAGNOSIS — Z72.0 TOBACCO ABUSE DISORDER: ICD-10-CM

## 2021-03-31 PROCEDURE — 3017F COLORECTAL CA SCREEN DOC REV: CPT | Performed by: INTERNAL MEDICINE

## 2021-03-31 PROCEDURE — G8926 SPIRO NO PERF OR DOC: HCPCS | Performed by: INTERNAL MEDICINE

## 2021-03-31 PROCEDURE — G8400 PT W/DXA NO RESULTS DOC: HCPCS | Performed by: INTERNAL MEDICINE

## 2021-03-31 PROCEDURE — 1090F PRES/ABSN URINE INCON ASSESS: CPT | Performed by: INTERNAL MEDICINE

## 2021-03-31 PROCEDURE — 3023F SPIROM DOC REV: CPT | Performed by: INTERNAL MEDICINE

## 2021-03-31 PROCEDURE — 4040F PNEUMOC VAC/ADMIN/RCVD: CPT | Performed by: INTERNAL MEDICINE

## 2021-03-31 PROCEDURE — G8417 CALC BMI ABV UP PARAM F/U: HCPCS | Performed by: INTERNAL MEDICINE

## 2021-03-31 PROCEDURE — 1123F ACP DISCUSS/DSCN MKR DOCD: CPT | Performed by: INTERNAL MEDICINE

## 2021-03-31 PROCEDURE — G8427 DOCREV CUR MEDS BY ELIG CLIN: HCPCS | Performed by: INTERNAL MEDICINE

## 2021-03-31 PROCEDURE — 4004F PT TOBACCO SCREEN RCVD TLK: CPT | Performed by: INTERNAL MEDICINE

## 2021-03-31 PROCEDURE — G8484 FLU IMMUNIZE NO ADMIN: HCPCS | Performed by: INTERNAL MEDICINE

## 2021-03-31 PROCEDURE — 99214 OFFICE O/P EST MOD 30 MIN: CPT | Performed by: INTERNAL MEDICINE

## 2021-03-31 ASSESSMENT — ENCOUNTER SYMPTOMS
COUGH: 1
VOICE CHANGE: 0
ABDOMINAL PAIN: 0
NAUSEA: 0
EYE DISCHARGE: 0
DIARRHEA: 0
SHORTNESS OF BREATH: 1
SORE THROAT: 0
EYE ITCHING: 0
CHEST TIGHTNESS: 0
VOMITING: 0
SINUS PRESSURE: 0
TROUBLE SWALLOWING: 0
RHINORRHEA: 0
WHEEZING: 1

## 2021-03-31 NOTE — PROGRESS NOTES
Subjective:     Kaylah Edge is a 66 y.o. female who complains today of:     Chief Complaint   Patient presents with    Follow-up     four month f/u for COPD. HPI  She is using bronchodilator with nebulizer albuterol and ipatropium, combivent respimat, albuterol HFA  C/o shortness of breath with exertion. Occasional Wheezing   C/o Cough with white Sputum  No Hemoptysis  No Chest tightness   No Chest pain with radiation  or pleuritic pain  No Fever or chills. No Rhinorrhea and postnasal drip. She is still smoking less than 1 ppd    Allergies:  Patient has no known allergies. Past Medical History:   Diagnosis Date    Chronic bronchitis (HCC)     COPD (chronic obstructive pulmonary disease) ([facility] [address]     Lung disease      No past surgical history on file.   Family History   Problem Relation Age of Onset    Brain Cancer Father      Social History     Socioeconomic History    Marital status: Single     Spouse name: Not on file    Number of children: Not on file    Years of education: Not on file    Highest education level: Not on file   Occupational History    Not on file   Social Needs    Financial resource strain: Not on file    Food insecurity     Worry: Not on file     Inability: Not on file    Transportation needs     Medical: Not on file     Non-medical: Not on file   Tobacco Use    Smoking status: Current Every Day Smoker     Packs/day: 1.00    Smokeless tobacco: Never Used   Substance and Sexual Activity    Alcohol use: Yes     Comment: social    Drug use: No    Sexual activity: Not on file   Lifestyle    Physical activity     Days per week: Not on file     Minutes per session: Not on file    Stress: Not on file   Relationships    Social connections     Talks on phone: Not on file     Gets together: Not on file     Attends Faith service: Not on file     Active member of club or organization: Not on file     Attends meetings of clubs or organizations: Not on file     Relationship status: Not on file    Intimate partner violence     Fear of current or ex partner: Not on file     Emotionally abused: Not on file     Physically abused: Not on file     Forced sexual activity: Not on file   Other Topics Concern    Not on file   Social History Narrative    Not on file         Review of Systems   Constitutional: Negative for chills, diaphoresis, fatigue and fever. HENT: Negative for congestion, mouth sores, nosebleeds, postnasal drip, rhinorrhea, sinus pressure, sneezing, sore throat, trouble swallowing and voice change. Eyes: Negative for discharge, itching and visual disturbance. Respiratory: Positive for cough, shortness of breath and wheezing. Negative for chest tightness. Cardiovascular: Negative for chest pain, palpitations and leg swelling. Gastrointestinal: Negative for abdominal pain, diarrhea, nausea and vomiting. Genitourinary: Negative for difficulty urinating and hematuria. Musculoskeletal: Negative for arthralgias, joint swelling and myalgias. Skin: Negative for rash. Allergic/Immunologic: Negative for environmental allergies and food allergies. Neurological: Negative for dizziness, tremors, weakness and headaches. Psychiatric/Behavioral: Negative for behavioral problems and sleep disturbance.         :     Vitals:    03/31/21 0903 03/31/21 0907   BP: (!) 143/99 (!) 143/99   Pulse: 122    Resp: 18    Temp: 98.3 °F (36.8 °C)    SpO2: (!) 87% 94%   Weight: 155 lb (70.3 kg)    Height: 5' 1\" (1.549 m)      Wt Readings from Last 3 Encounters:   03/31/21 155 lb (70.3 kg)   10/29/20 151 lb (68.5 kg)   07/27/20 158 lb (71.7 kg)         Physical Exam  Constitutional:       General: She is not in acute distress. Appearance: She is well-developed. She is not diaphoretic. HENT:      Head: Normocephalic and atraumatic. Nose: Nose normal.   Eyes:      Pupils: Pupils are equal, round, and reactive to light. Neck:      Thyroid: No thyromegaly.       Vascular: No JVD.      Trachea: No tracheal deviation. Cardiovascular:      Rate and Rhythm: Normal rate and regular rhythm. Heart sounds: No murmur. No friction rub. No gallop. Pulmonary:      Effort: No respiratory distress. Breath sounds: No wheezing or rales. Comments: diminished Breath sound bilaterally. Chest:      Chest wall: No tenderness. Abdominal:      General: There is no distension. Tenderness: There is no abdominal tenderness. There is no rebound. Musculoskeletal: Normal range of motion. Lymphadenopathy:      Cervical: No cervical adenopathy. Skin:     General: Skin is warm and dry. Neurological:      Mental Status: She is alert and oriented to person, place, and time. Coordination: Coordination normal.         Current Outpatient Medications   Medication Sig Dispense Refill    COMBIVENT RESPIMAT  MCG/ACT AERS inhaler INHALE 1 INHALATION BY  MOUTH EVERY 6 HOURS 12 g 3    albuterol sulfate  (90 Base) MCG/ACT inhaler Inhale 2 puffs into the lungs 4 times daily as needed for Wheezing 6.7 Inhaler 5    ipratropium-albuterol (DUONEB) 0.5-2.5 (3) MG/3ML SOLN nebulizer solution Inhale 3 mLs into the lungs every 4 hours 120 mL 3    albuterol (PROVENTIL) (2.5 MG/3ML) 0.083% nebulizer solution Take 3 mLs by nebulization every 6 hours as needed for Wheezing 120 each 5     No current facility-administered medications for this visit. Results for orders placed during the hospital encounter of 08/20/20   XR CHEST (2 VW)    Narrative X-RAY: A CHEST, 2 VIEW(S):       CLINICAL HISTORY:  J44.9 Chronic obstructive pulmonary disease, unspecified COPD type ([facility] [address] ICD10  , no complaints today. DATE: 8/20/2020 8:10 AM    COMPARISONS: 8/24/2019    FINDINGS: Normal cardiac silhouette. There are atherosclerotic calcifications in the aortic arch. Lungs are clear without consolidation. No pleural effusion or pneumothorax. There are mild degenerative changes in spine. Impression NO ACUTE CARDIOPULMONARY ABNORMALITY. NO CHANGE. Assessment/Plan:     1. Chronic obstructive pulmonary disease, unspecified COPD type ([facility] [address]  She is using bronchodilator with nebulizer albuterol and ipatropium, combivent respimat, albuterol HFA  C/o shortness of breath with exertion. Occasional Wheezing . C/o Cough with white Sputum. Continue bronchodilator as before, she does not want O2. , CXR requested. She does not want Ct chest for lung screening. 2. Tobacco abuse disorder  She is advised try to quit smoking. Risks related to smoking explained to the patient. Different ways to help to quit smoking were discussed today. She is still smoking 1 ppd    3. Hypoxia  She does not want O2 at this time. Spo2 87% on RA  And improved O2 94%      Return in about 4 months (around 7/31/2021) for COPD.       Adela Bose MD

## 2021-04-07 ENCOUNTER — TELEPHONE (OUTPATIENT)
Dept: PULMONOLOGY | Age: 71
End: 2021-04-07

## 2021-04-07 RX ORDER — PREDNISONE 10 MG/1
TABLET ORAL
Qty: 40 TABLET | Refills: 0 | Status: SHIPPED | OUTPATIENT
Start: 2021-04-07 | End: 2022-03-11 | Stop reason: SDUPTHER

## 2021-04-07 NOTE — TELEPHONE ENCOUNTER
PATIENT STATES SHE IS HAVING A COPD FLARE UP. CAN YOU CALL HER IN SOMETHING FOR THIS. PLEASE ADVISE.

## 2021-06-01 RX ORDER — ALBUTEROL SULFATE 90 UG/1
AEROSOL, METERED RESPIRATORY (INHALATION)
Qty: 17 G | Refills: 6 | Status: SHIPPED | OUTPATIENT
Start: 2021-06-01 | End: 2021-08-02 | Stop reason: SDUPTHER

## 2021-08-02 ENCOUNTER — OFFICE VISIT (OUTPATIENT)
Dept: PULMONOLOGY | Age: 71
End: 2021-08-02
Payer: MEDICARE

## 2021-08-02 VITALS
OXYGEN SATURATION: 94 % | WEIGHT: 160 LBS | HEIGHT: 61 IN | HEART RATE: 85 BPM | BODY MASS INDEX: 30.21 KG/M2 | SYSTOLIC BLOOD PRESSURE: 138 MMHG | DIASTOLIC BLOOD PRESSURE: 88 MMHG | TEMPERATURE: 98.2 F

## 2021-08-02 DIAGNOSIS — R09.02 HYPOXIA: ICD-10-CM

## 2021-08-02 DIAGNOSIS — J44.9 CHRONIC OBSTRUCTIVE PULMONARY DISEASE, UNSPECIFIED COPD TYPE (HCC): Primary | ICD-10-CM

## 2021-08-02 DIAGNOSIS — Z72.0 TOBACCO ABUSE DISORDER: ICD-10-CM

## 2021-08-02 PROCEDURE — G8427 DOCREV CUR MEDS BY ELIG CLIN: HCPCS | Performed by: INTERNAL MEDICINE

## 2021-08-02 PROCEDURE — 99214 OFFICE O/P EST MOD 30 MIN: CPT | Performed by: INTERNAL MEDICINE

## 2021-08-02 PROCEDURE — 1090F PRES/ABSN URINE INCON ASSESS: CPT | Performed by: INTERNAL MEDICINE

## 2021-08-02 PROCEDURE — 3017F COLORECTAL CA SCREEN DOC REV: CPT | Performed by: INTERNAL MEDICINE

## 2021-08-02 PROCEDURE — G8400 PT W/DXA NO RESULTS DOC: HCPCS | Performed by: INTERNAL MEDICINE

## 2021-08-02 PROCEDURE — 4004F PT TOBACCO SCREEN RCVD TLK: CPT | Performed by: INTERNAL MEDICINE

## 2021-08-02 PROCEDURE — 1123F ACP DISCUSS/DSCN MKR DOCD: CPT | Performed by: INTERNAL MEDICINE

## 2021-08-02 PROCEDURE — G8417 CALC BMI ABV UP PARAM F/U: HCPCS | Performed by: INTERNAL MEDICINE

## 2021-08-02 PROCEDURE — 4040F PNEUMOC VAC/ADMIN/RCVD: CPT | Performed by: INTERNAL MEDICINE

## 2021-08-02 PROCEDURE — 3023F SPIROM DOC REV: CPT | Performed by: INTERNAL MEDICINE

## 2021-08-02 PROCEDURE — G8926 SPIRO NO PERF OR DOC: HCPCS | Performed by: INTERNAL MEDICINE

## 2021-08-02 RX ORDER — ALBUTEROL SULFATE 90 UG/1
AEROSOL, METERED RESPIRATORY (INHALATION)
Qty: 3 INHALER | Refills: 0 | Status: SHIPPED | OUTPATIENT
Start: 2021-08-02 | End: 2021-09-24

## 2021-08-02 RX ORDER — IPRATROPIUM BROMIDE AND ALBUTEROL SULFATE 2.5; .5 MG/3ML; MG/3ML
1 SOLUTION RESPIRATORY (INHALATION) 4 TIMES DAILY
Qty: 360 VIAL | Refills: 1 | Status: SHIPPED | OUTPATIENT
Start: 2021-08-02 | End: 2021-10-29 | Stop reason: SDUPTHER

## 2021-08-02 ASSESSMENT — ENCOUNTER SYMPTOMS
EYE ITCHING: 0
SINUS PRESSURE: 0
RHINORRHEA: 0
VOICE CHANGE: 0
EYE DISCHARGE: 0
VOMITING: 0
COUGH: 1
ABDOMINAL PAIN: 0
SORE THROAT: 0
CHEST TIGHTNESS: 0
NAUSEA: 0
WHEEZING: 1
TROUBLE SWALLOWING: 0
SHORTNESS OF BREATH: 1
DIARRHEA: 0

## 2021-08-02 NOTE — PROGRESS NOTES
Subjective:     Erika Quiñones is a 66 y.o. female who complains today of:     Chief Complaint   Patient presents with    COPD     4 month f/u       HPI  She is using nebulizer albuterol and ipatropium QID prn , combivent respimat QID , albuterol HFA  C/o shortness of breath with exertion. Off and on Wheezing. C/o Cough with  Clear whiteSputum. No Hemoptysis. No Chest tightness. No Chest pain with radiation  or pleuritic pain. No  leg edema. No orthopnea. No Fever or chills. No Rhinorrhea and postnasal drip. She is still smoking less than 1 ppd to 1 ppd  CXR done  On 7/29/21 show COPD . No active disease, she refuse to have lung screening CT chest done     Allergies:  Patient has no known allergies. Past Medical History:   Diagnosis Date    Chronic bronchitis (HCC)     COPD (chronic obstructive pulmonary disease) ([facility] [address]     Lung disease      No past surgical history on file. Family History   Problem Relation Age of Onset    Brain Cancer Father      Social History     Socioeconomic History    Marital status: Single     Spouse name: Not on file    Number of children: Not on file    Years of education: Not on file    Highest education level: Not on file   Occupational History    Not on file   Tobacco Use    Smoking status: Current Every Day Smoker     Packs/day: 1.00    Smokeless tobacco: Never Used   Substance and Sexual Activity    Alcohol use: Yes     Comment: social    Drug use: No    Sexual activity: Not on file   Other Topics Concern    Not on file   Social History Narrative    Not on file     Social Determinants of Health     Financial Resource Strain:     Difficulty of Paying Living Expenses:    Food Insecurity:     Worried About Running Out of Food in the Last Year:     [address] in the Last Year:    Transportation Needs:     Lack of Transportation (Medical):      Lack of Transportation (Non-Medical):    Physical Activity:     Days of Exercise per Week:     Minutes of Exercise per Session:    Stress:     Feeling of Stress :    Social Connections:     Frequency of Communication with Friends and Family:     Frequency of Social Gatherings with Friends and Family:     Attends Scientologist Services:     Active Member of Clubs or Organizations:     Attends Club or Organization Meetings:     Marital Status:    Intimate Partner Violence:     Fear of Current or Ex-Partner:     Emotionally Abused:     Physically Abused:     Sexually Abused:          Review of Systems   Constitutional: Negative for chills, diaphoresis, fatigue and fever. HENT: Negative for congestion, mouth sores, nosebleeds, postnasal drip, rhinorrhea, sinus pressure, sneezing, sore throat, trouble swallowing and voice change. Eyes: Negative for discharge, itching and visual disturbance. Respiratory: Positive for cough, shortness of breath and wheezing. Negative for chest tightness. Cardiovascular: Negative for chest pain, palpitations and leg swelling. Gastrointestinal: Negative for abdominal pain, diarrhea, nausea and vomiting. Genitourinary: Negative for difficulty urinating and hematuria. Musculoskeletal: Negative for arthralgias, joint swelling and myalgias. Skin: Negative for rash. Allergic/Immunologic: Negative for environmental allergies and food allergies. Neurological: Negative for dizziness, tremors, weakness and headaches. Psychiatric/Behavioral: Negative for behavioral problems and sleep disturbance.         :     Vitals:    08/02/21 0839   BP: 138/88   Pulse: 85   Temp: 98.2 °F (36.8 °C)   SpO2: 94%   Weight: 160 lb (72.6 kg)   Height: 5' 1\" (1.549 m)     Wt Readings from Last 3 Encounters:   08/02/21 160 lb (72.6 kg)   03/31/21 155 lb (70.3 kg)   10/29/20 151 lb (68.5 kg)         Physical Exam  Constitutional:       General: She is not in acute distress. Appearance: She is well-developed. She is not diaphoretic. HENT:      Head: Normocephalic and atraumatic. Nose: Nose normal.   Eyes:      Pupils: Pupils are equal, round, and reactive to light. Neck:      Thyroid: No thyromegaly. Vascular: No JVD. Trachea: No tracheal deviation. Cardiovascular:      Rate and Rhythm: Normal rate and regular rhythm. Heart sounds: No murmur heard. No friction rub. No gallop. Pulmonary:      Effort: No respiratory distress. Breath sounds: Wheezing present. No rales. Comments: diminished Breath sound bilaterally. Chest:      Chest wall: No tenderness. Abdominal:      General: There is no distension. Tenderness: There is no abdominal tenderness. There is no rebound. Musculoskeletal:         General: Normal range of motion. Lymphadenopathy:      Cervical: No cervical adenopathy. Skin:     General: Skin is warm and dry. Neurological:      Mental Status: She is alert and oriented to person, place, and time. Coordination: Coordination normal.         Current Outpatient Medications   Medication Sig Dispense Refill    ipratropium-albuterol (DUONEB) 0.5-2.5 (3) MG/3ML SOLN nebulizer solution Inhale 3 mLs into the lungs 4 times daily 360 vial 1    albuterol sulfate  (90 Base) MCG/ACT inhaler INHALE 2 INHALATIONS BY  MOUTH INTO THE LUNGS 4  TIMES DAILY AS NEEDED FOR  WHEEZING 3 Inhaler 0    COMBIVENT RESPIMAT  MCG/ACT AERS inhaler INHALE 1 INHALATION BY  MOUTH EVERY 6 HOURS 12 g 3    albuterol (PROVENTIL) (2.5 MG/3ML) 0.083% nebulizer solution Take 3 mLs by nebulization every 6 hours as needed for Wheezing 120 each 5    predniSONE (DELTASONE) 10 MG tablet 4 tablets daily for 4 days, 3 tablets daily for 4 days, 2 tablets daily for 4 days, then 1 tablet daily for 4 days (Patient not taking: Reported on 8/2/2021) 40 tablet 0     No current facility-administered medications for this visit.        Results for orders placed in visit on 07/29/21    XR CHEST STANDARD (2 VW)    Narrative  DIAGNOSIS:J44.9 Chronic obstructive pulmonary disease, unspecified COPD type ([address] ICD10    COMPARISON: August 21, 2020    TECHNIQUE: PA and lateral chest    FINDINGS: The lungs are hyperinflated and there is coarsening of the interstitium. Flattening of the diaphragm and increased AP diameter is seen. The lungs contain no focal infiltrate, pleural effusion, consolidation or pneumothorax. The cardiac  silhouette is not enlarged. Calcifications are seen in the thoracic aorta. There are degenerative changes in the dorsal spine. Impression  No acute cardiopulmonary process, findings are suggestive of COPD      Results for orders placed during the hospital encounter of 08/20/20    XR CHEST (2 VW)    Narrative  X-RAY: A CHEST, 2 VIEW(S):    CLINICAL HISTORY:  J44.9 Chronic obstructive pulmonary disease, unspecified COPD type ([address] ICD10  , no complaints today. DATE: 8/20/2020 8:10 AM    COMPARISONS: 8/24/2019    FINDINGS: Normal cardiac silhouette. There are atherosclerotic calcifications in the aortic arch. Lungs are clear without consolidation. No pleural effusion or pneumothorax. There are mild degenerative changes in spine. Impression  NO ACUTE CARDIOPULMONARY ABNORMALITY. NO CHANGE. Results for orders placed during the hospital encounter of 08/24/19    XR CHEST STANDARD (2 VW)    Narrative  EXAMINATION: XR CHEST (2 VW)    REASON FOR EXAM:J44.9 Chronic obstructive pulmonary disease, unspecified COPD type ([address] ICD10    FINDINGS: Plain films reveal heart and mediastinal structures within normal limits and the lung fields free of active disease. Bones and soft tissues intact. No change from June 6, 2018. Impression  NO ACTIVE DISEASE IN THE CHEST. Assessment/Plan:     1. Chronic obstructive pulmonary disease, unspecified COPD type ([address]  She is using nebulizer albuterol and ipatropium QID prn , combivent respimat QID , albuterol HFA  C/o shortness of breath with exertion. Off and on Wheezing. C/o Cough with  Clear whiteSputum.   CXR done On 7/29/21 show COPD . No active disease, she refuse to have lung screening CT chest done . she need refill on neb solution. 2. Tobacco abuse disorder  She is advised try to quit smoking. Risks related to smoking explained to the patient. Different ways to help to quit smoking were discussed today. She is still smoking less than 1 ppd to 1 ppd    3. Hypoxia  She said Spo2 goes down to 89% , currently on RA spo2 94%, she does not want to ambulate to qualify for O2. She does not want to start O2      Return in about 4 months (around 12/2/2021).       Jatinder Thompson MD

## 2021-09-24 DIAGNOSIS — J44.9 CHRONIC OBSTRUCTIVE PULMONARY DISEASE, UNSPECIFIED COPD TYPE (HCC): ICD-10-CM

## 2021-09-24 RX ORDER — ALBUTEROL SULFATE 90 UG/1
AEROSOL, METERED RESPIRATORY (INHALATION)
Qty: 25.5 G | Refills: 3 | Status: SHIPPED | OUTPATIENT
Start: 2021-09-24 | End: 2022-04-20 | Stop reason: SDUPTHER

## 2021-10-29 DIAGNOSIS — J44.9 CHRONIC OBSTRUCTIVE PULMONARY DISEASE, UNSPECIFIED COPD TYPE (HCC): ICD-10-CM

## 2021-10-30 RX ORDER — IPRATROPIUM BROMIDE AND ALBUTEROL SULFATE 2.5; .5 MG/3ML; MG/3ML
1 SOLUTION RESPIRATORY (INHALATION) 4 TIMES DAILY
Qty: 360 ML | Refills: 3 | Status: SHIPPED | OUTPATIENT
Start: 2021-10-30 | End: 2022-08-22 | Stop reason: SDUPTHER

## 2021-12-02 ENCOUNTER — TELEPHONE (OUTPATIENT)
Dept: PULMONOLOGY | Age: 71
End: 2021-12-02

## 2021-12-02 DIAGNOSIS — J44.9 CHRONIC OBSTRUCTIVE PULMONARY DISEASE, UNSPECIFIED COPD TYPE (HCC): Primary | ICD-10-CM

## 2021-12-02 NOTE — TELEPHONE ENCOUNTER
Called the patient I didn't know where to send the order.   I will send it to [facility]

## 2021-12-20 ENCOUNTER — OFFICE VISIT (OUTPATIENT)
Dept: PULMONOLOGY | Age: 71
End: 2021-12-20
Payer: MEDICARE

## 2021-12-20 VITALS
HEART RATE: 111 BPM | WEIGHT: 162 LBS | TEMPERATURE: 98.1 F | HEIGHT: 60 IN | OXYGEN SATURATION: 93 % | DIASTOLIC BLOOD PRESSURE: 66 MMHG | BODY MASS INDEX: 31.8 KG/M2 | SYSTOLIC BLOOD PRESSURE: 120 MMHG

## 2021-12-20 DIAGNOSIS — R09.02 HYPOXIA: ICD-10-CM

## 2021-12-20 DIAGNOSIS — J44.9 CHRONIC OBSTRUCTIVE PULMONARY DISEASE, UNSPECIFIED COPD TYPE (HCC): ICD-10-CM

## 2021-12-20 DIAGNOSIS — Z72.0 TOBACCO ABUSE DISORDER: Primary | ICD-10-CM

## 2021-12-20 PROCEDURE — 3023F SPIROM DOC REV: CPT | Performed by: INTERNAL MEDICINE

## 2021-12-20 PROCEDURE — 1090F PRES/ABSN URINE INCON ASSESS: CPT | Performed by: INTERNAL MEDICINE

## 2021-12-20 PROCEDURE — G8484 FLU IMMUNIZE NO ADMIN: HCPCS | Performed by: INTERNAL MEDICINE

## 2021-12-20 PROCEDURE — 4040F PNEUMOC VAC/ADMIN/RCVD: CPT | Performed by: INTERNAL MEDICINE

## 2021-12-20 PROCEDURE — 4004F PT TOBACCO SCREEN RCVD TLK: CPT | Performed by: INTERNAL MEDICINE

## 2021-12-20 PROCEDURE — 99214 OFFICE O/P EST MOD 30 MIN: CPT | Performed by: INTERNAL MEDICINE

## 2021-12-20 PROCEDURE — G8417 CALC BMI ABV UP PARAM F/U: HCPCS | Performed by: INTERNAL MEDICINE

## 2021-12-20 PROCEDURE — 3017F COLORECTAL CA SCREEN DOC REV: CPT | Performed by: INTERNAL MEDICINE

## 2021-12-20 PROCEDURE — G8427 DOCREV CUR MEDS BY ELIG CLIN: HCPCS | Performed by: INTERNAL MEDICINE

## 2021-12-20 PROCEDURE — G8400 PT W/DXA NO RESULTS DOC: HCPCS | Performed by: INTERNAL MEDICINE

## 2021-12-20 PROCEDURE — 1123F ACP DISCUSS/DSCN MKR DOCD: CPT | Performed by: INTERNAL MEDICINE

## 2021-12-20 RX ORDER — ALBUTEROL SULFATE 2.5 MG/3ML
2.5 SOLUTION RESPIRATORY (INHALATION) EVERY 6 HOURS PRN
Qty: 120 EACH | Refills: 5 | Status: SHIPPED | OUTPATIENT
Start: 2021-12-20 | End: 2022-08-22 | Stop reason: ALTCHOICE

## 2021-12-20 ASSESSMENT — ENCOUNTER SYMPTOMS
COUGH: 1
SINUS PRESSURE: 0
SORE THROAT: 0
TROUBLE SWALLOWING: 0
WHEEZING: 1
SHORTNESS OF BREATH: 1
VOMITING: 0
VOICE CHANGE: 0
EYE ITCHING: 0
DIARRHEA: 0
RHINORRHEA: 0
EYE DISCHARGE: 0
NAUSEA: 0
CHEST TIGHTNESS: 0
ABDOMINAL PAIN: 0

## 2021-12-20 NOTE — PROGRESS NOTES
Subjective:     Alondra Barajas is a 66 y.o. female who complains today of:     Chief Complaint   Patient presents with    COPD     4 mnth follow up        HPI  She is using nebulizer albuterol and ipatropium QID prn , combivent respimat QID , albuterol HFA. C/o shortness of breath with exertion. C/o Wheezing. C/o Cough with  Clear  Sputum. No Hemoptysis. No Chest tightness. No Chest pain with radiation  or pleuritic pain. No  leg edema. No orthopnea. No Fever or chills. No Rhinorrhea and postnasal drip. She is still smoking less than 1 ppd. She does want to have CT chest done    Allergies:  Patient has no known allergies. Past Medical History:   Diagnosis Date    Chronic bronchitis (HCC)     COPD (chronic obstructive pulmonary disease) ([facility] [address]     Lung disease      No past surgical history on file. Family History   Problem Relation Age of Onset    Brain Cancer Father      Social History     Socioeconomic History    Marital status: Single     Spouse name: Not on file    Number of children: Not on file    Years of education: Not on file    Highest education level: Not on file   Occupational History    Not on file   Tobacco Use    Smoking status: Current Every Day Smoker     Packs/day: 1.00    Smokeless tobacco: Never Used   Substance and Sexual Activity    Alcohol use: Yes     Comment: social    Drug use: No    Sexual activity: Not on file   Other Topics Concern    Not on file   Social History Narrative    Not on file     Social Determinants of Health     Financial Resource Strain:     Difficulty of Paying Living Expenses: Not on file   Food Insecurity:     Worried About Running Out of Food in the Last Year: Not on file    Kizzy of Food in the Last Year: Not on file   Transportation Needs:     Lack of Transportation (Medical): Not on file    Lack of Transportation (Non-Medical):  Not on file   Physical Activity:     Days of Exercise per Week: Not on file    Minutes of Exercise per Session: Not on file   Stress:     Feeling of Stress : Not on file   Social Connections:     Frequency of Communication with Friends and Family: Not on file    Frequency of Social Gatherings with Friends and Family: Not on file    Attends Worship Services: Not on file    Active Member of Clubs or Organizations: Not on file    Attends Club or Organization Meetings: Not on file    Marital Status: Not on file   Intimate Partner Violence:     Fear of Current or Ex-Partner: Not on file    Emotionally Abused: Not on file    Physically Abused: Not on file    Sexually Abused: Not on file   Housing Stability:     Unable to Pay for Housing in the Last Year: Not on file    Number of [place] in the Last Year: Not on file    Unstable Housing in the Last Year: Not on file         Review of Systems   Constitutional: Negative for chills, diaphoresis, fatigue and fever. HENT: Negative for congestion, mouth sores, nosebleeds, postnasal drip, rhinorrhea, sinus pressure, sneezing, sore throat, trouble swallowing and voice change. Eyes: Negative for discharge, itching and visual disturbance. Respiratory: Positive for cough, shortness of breath and wheezing. Negative for chest tightness. Cardiovascular: Negative for chest pain, palpitations and leg swelling. Gastrointestinal: Negative for abdominal pain, diarrhea, nausea and vomiting. Genitourinary: Negative for difficulty urinating and hematuria. Musculoskeletal: Negative for arthralgias, joint swelling and myalgias. Skin: Negative for rash. Allergic/Immunologic: Negative for environmental allergies and food allergies. Neurological: Negative for dizziness, tremors, weakness and headaches.    Psychiatric/Behavioral: Negative for behavioral problems and sleep disturbance.         :     Vitals:    12/20/21 1114   BP: 120/66   Pulse: 111   Temp: 98.1 °F (36.7 °C)   TempSrc: Temporal   SpO2: 93%   Weight: 162 lb (73.5 kg)   Height: 5' (1.524 m)     Wt Readings from Last 3 Encounters:   12/20/21 162 lb (73.5 kg)   08/02/21 160 lb (72.6 kg)   03/31/21 155 lb (70.3 kg)         Physical Exam  Constitutional:       General: She is not in acute distress. Appearance: She is well-developed. She is obese. She is not diaphoretic. HENT:      Head: Normocephalic and atraumatic. Nose: Nose normal.   Eyes:      Pupils: Pupils are equal, round, and reactive to light. Neck:      Thyroid: No thyromegaly. Vascular: No JVD. Trachea: No tracheal deviation. Cardiovascular:      Rate and Rhythm: Normal rate and regular rhythm. Heart sounds: No murmur heard. No friction rub. No gallop. Pulmonary:      Effort: No respiratory distress. Breath sounds: Wheezing present. No rales. Comments: diminished Breath sound bilaterally. Chest:      Chest wall: No tenderness. Abdominal:      General: There is no distension. Tenderness: There is no abdominal tenderness. There is no rebound. Musculoskeletal:         General: Normal range of motion. Lymphadenopathy:      Cervical: No cervical adenopathy. Skin:     General: Skin is warm and dry. Neurological:      Mental Status: She is alert and oriented to person, place, and time.       Coordination: Coordination normal.         Current Outpatient Medications   Medication Sig Dispense Refill    albuterol-ipratropium (COMBIVENT RESPIMAT)  MCG/ACT AERS inhaler INHALE 1 INHALATION BY  MOUTH EVERY 6 HOURS 12 g 3    albuterol (PROVENTIL) (2.5 MG/3ML) 0.083% nebulizer solution Take 3 mLs by nebulization every 6 hours as needed for Wheezing 120 each 5    Respiratory Therapy Supplies KIT As directed 1 kit 0    ipratropium-albuterol (DUONEB) 0.5-2.5 (3) MG/3ML SOLN nebulizer solution Inhale 3 mLs into the lungs 4 times daily 360 mL 3    albuterol sulfate  (90 Base) MCG/ACT inhaler USE 2 INHALATIONS BY MOUTH  4 TIMES DAILY AS NEEDED FOR WHEEZING 25.5 g 3    predniSONE (DELTASONE) 10 MG tablet 4 tablets daily for 4 days, 3 tablets daily for 4 days, 2 tablets daily for 4 days, then 1 tablet daily for 4 days (Patient not taking: Reported on 8/2/2021) 40 tablet 0     No current facility-administered medications for this visit. Results for orders placed in visit on 07/29/21    XR CHEST STANDARD (2 VW)    Narrative  DIAGNOSIS:J44.9 Chronic obstructive pulmonary disease, unspecified COPD type ([address] ICD10    COMPARISON: August 21, 2020    TECHNIQUE: PA and lateral chest    FINDINGS: The lungs are hyperinflated and there is coarsening of the interstitium. Flattening of the diaphragm and increased AP diameter is seen. The lungs contain no focal infiltrate, pleural effusion, consolidation or pneumothorax. The cardiac  silhouette is not enlarged. Calcifications are seen in the thoracic aorta. There are degenerative changes in the dorsal spine. Impression  No acute cardiopulmonary process, findings are suggestive of COPD      Results for orders placed during the hospital encounter of 08/20/20    XR CHEST (2 VW)    Narrative  X-RAY: A CHEST, 2 VIEW(S):    CLINICAL HISTORY:  J44.9 Chronic obstructive pulmonary disease, unspecified COPD type ([address] ICD10  , no complaints today. DATE: 8/20/2020 8:10 AM    COMPARISONS: 8/24/2019    FINDINGS: Normal cardiac silhouette. There are atherosclerotic calcifications in the aortic arch. Lungs are clear without consolidation. No pleural effusion or pneumothorax. There are mild degenerative changes in spine. Impression  NO ACUTE CARDIOPULMONARY ABNORMALITY. NO CHANGE. Results for orders placed during the hospital encounter of 08/24/19    XR CHEST STANDARD (2 VW)    Narrative  EXAMINATION: XR CHEST (2 VW)    REASON FOR EXAM:J44.9 Chronic obstructive pulmonary disease, unspecified COPD type ([address] ICD10    FINDINGS: Plain films reveal heart and mediastinal structures within normal limits and the lung fields free of active disease.     Bones and soft tissues intact. No change from June 6, 2018. Impression  NO ACTIVE DISEASE IN THE CHEST. Assessment/Plan:     1. Chronic obstructive pulmonary disease, unspecified COPD type ([address]  She is using nebulizer albuterol and ipatropium QID prn , combivent respimat QID , albuterol HFA. C/o shortness of breath with exertion. C/o Wheezing. C/o Cough with  Clear  Sputum. Continue bronchodilator therapy as before. She does want to have CT chest done. - albuterol-ipratropium (COMBIVENT RESPIMAT)  MCG/ACT AERS inhaler; INHALE 1 INHALATION BY  MOUTH EVERY 6 HOURS  Dispense: 12 g; Refill: 3  - albuterol (PROVENTIL) (2.5 MG/3ML) 0.083% nebulizer solution; Take 3 mLs by nebulization every 6 hours as needed for Wheezing  Dispense: 120 each; Refill: 5    2. Tobacco abuse disorder  She is still smoking less than 1 ppd. She is advised try to quit smoking. Risks related to smoking explained to the patient. Different ways to help to quit smoking were discussed today. 3. Hypoxia  She said Spo2 goes down to 89% with activity. She does not want to ambulate to qualify for O2. She does not want to start O2      Return in about 4 months (around 4/20/2022) for COPD.       Lizette Mahoney MD

## 2022-03-11 RX ORDER — PREDNISONE 10 MG/1
TABLET ORAL
Qty: 40 TABLET | Refills: 0 | Status: SHIPPED | OUTPATIENT
Start: 2022-03-11 | End: 2022-04-20 | Stop reason: ALTCHOICE

## 2022-03-11 NOTE — TELEPHONE ENCOUNTER
Having a COPD flair up         Rx requested:  Requested Prescriptions     Pending Prescriptions Disp Refills    predniSONE (DELTASONE) 10 MG tablet 40 tablet 0     Si tablets daily for 4 days, 3 tablets daily for 4 days, 2 tablets daily for 4 days, then 1 tablet daily for 4 days       Last Office Visit:   2021      Next Visit Date:  Future Appointments   Date Time Provider Kaylie Hodge   2022 10:00 AM Jon Chapa MD [facility]

## 2022-03-17 DIAGNOSIS — J44.9 CHRONIC OBSTRUCTIVE PULMONARY DISEASE, UNSPECIFIED COPD TYPE (HCC): ICD-10-CM

## 2022-03-17 NOTE — TELEPHONE ENCOUNTER
Rx requested:  Requested Prescriptions     Pending Prescriptions Disp Refills    albuterol-ipratropium (COMBIVENT RESPIMAT)  MCG/ACT AERS inhaler 12 g 3     Sig: INHALE 1 INHALATION BY  MOUTH EVERY 6 HOURS       Last Office Visit:   12/20/2021      Next Visit Date:  Future Appointments   Date Time Provider Kaylie Hodge   4/20/2022 10:00 AM Kaden Nelson MD [facility]

## 2022-04-20 ENCOUNTER — OFFICE VISIT (OUTPATIENT)
Dept: PULMONOLOGY | Age: 72
End: 2022-04-20
Payer: MEDICARE

## 2022-04-20 VITALS
HEIGHT: 60 IN | HEART RATE: 81 BPM | OXYGEN SATURATION: 93 % | SYSTOLIC BLOOD PRESSURE: 139 MMHG | WEIGHT: 158 LBS | TEMPERATURE: 98.2 F | DIASTOLIC BLOOD PRESSURE: 86 MMHG | BODY MASS INDEX: 31.02 KG/M2

## 2022-04-20 DIAGNOSIS — Z72.0 TOBACCO ABUSE: ICD-10-CM

## 2022-04-20 DIAGNOSIS — J20.9 ACUTE BRONCHITIS, UNSPECIFIED ORGANISM: ICD-10-CM

## 2022-04-20 DIAGNOSIS — J44.9 CHRONIC OBSTRUCTIVE PULMONARY DISEASE, UNSPECIFIED COPD TYPE (HCC): ICD-10-CM

## 2022-04-20 DIAGNOSIS — J44.1 COPD WITH EXACERBATION (HCC): Primary | ICD-10-CM

## 2022-04-20 PROCEDURE — 1123F ACP DISCUSS/DSCN MKR DOCD: CPT | Performed by: INTERNAL MEDICINE

## 2022-04-20 PROCEDURE — 99214 OFFICE O/P EST MOD 30 MIN: CPT | Performed by: INTERNAL MEDICINE

## 2022-04-20 PROCEDURE — G8417 CALC BMI ABV UP PARAM F/U: HCPCS | Performed by: INTERNAL MEDICINE

## 2022-04-20 PROCEDURE — G8427 DOCREV CUR MEDS BY ELIG CLIN: HCPCS | Performed by: INTERNAL MEDICINE

## 2022-04-20 PROCEDURE — 3023F SPIROM DOC REV: CPT | Performed by: INTERNAL MEDICINE

## 2022-04-20 PROCEDURE — G8400 PT W/DXA NO RESULTS DOC: HCPCS | Performed by: INTERNAL MEDICINE

## 2022-04-20 PROCEDURE — 3017F COLORECTAL CA SCREEN DOC REV: CPT | Performed by: INTERNAL MEDICINE

## 2022-04-20 PROCEDURE — 4004F PT TOBACCO SCREEN RCVD TLK: CPT | Performed by: INTERNAL MEDICINE

## 2022-04-20 PROCEDURE — 1090F PRES/ABSN URINE INCON ASSESS: CPT | Performed by: INTERNAL MEDICINE

## 2022-04-20 PROCEDURE — 4040F PNEUMOC VAC/ADMIN/RCVD: CPT | Performed by: INTERNAL MEDICINE

## 2022-04-20 RX ORDER — AZITHROMYCIN 250 MG/1
250 TABLET, FILM COATED ORAL SEE ADMIN INSTRUCTIONS
Qty: 6 TABLET | Refills: 1 | Status: SHIPPED | OUTPATIENT
Start: 2022-04-20 | End: 2022-04-25

## 2022-04-20 RX ORDER — PREDNISONE 10 MG/1
TABLET ORAL
Qty: 40 TABLET | Refills: 0 | Status: SHIPPED | OUTPATIENT
Start: 2022-04-20 | End: 2022-07-18 | Stop reason: SDUPTHER

## 2022-04-20 RX ORDER — ALBUTEROL SULFATE 90 UG/1
AEROSOL, METERED RESPIRATORY (INHALATION)
Qty: 25.5 G | Refills: 3 | Status: SHIPPED | OUTPATIENT
Start: 2022-04-20 | End: 2022-04-23 | Stop reason: SDUPTHER

## 2022-04-20 ASSESSMENT — ENCOUNTER SYMPTOMS
SORE THROAT: 0
TROUBLE SWALLOWING: 0
VOMITING: 0
SINUS PRESSURE: 0
EYE DISCHARGE: 0
RHINORRHEA: 0
SHORTNESS OF BREATH: 1
CHEST TIGHTNESS: 0
NAUSEA: 0
DIARRHEA: 0
ABDOMINAL PAIN: 0
VOICE CHANGE: 0
EYE ITCHING: 0
WHEEZING: 1
COUGH: 1

## 2022-04-20 NOTE — PROGRESS NOTES
Subjective:     Matthias Adhikari is a 67 y.o. female who complains today of:     Chief Complaint   Patient presents with    COPD     4 month f/u       HPI  She is using nebulizer albuterol and ipatropium QID prn ,   Combivent respimat QID , albuterol HFA. C/o shortness of breath with exertion.   C/o off and on [facility]  C/o Cough with  whiteSputum. No Hemoptysis. No Chest tightness. No Chest pain with radiation  or pleuritic pain. No  leg edema. No orthopnea. No Fever or chills. No Rhinorrhea and postnasal drip.     She is still smoking less than 1 ppd. She does not want to have CT chest done. Allergies:  Patient has no known allergies. Past Medical History:   Diagnosis Date    Chronic bronchitis (HCC)     COPD (chronic obstructive pulmonary disease) ([facility] [address]     Lung disease      No past surgical history on file. Family History   Problem Relation Age of Onset    Brain Cancer Father      Social History     Socioeconomic History    Marital status: Single     Spouse name: Not on file    Number of children: Not on file    Years of education: Not on file    Highest education level: Not on file   Occupational History    Not on file   Tobacco Use    Smoking status: Current Every Day Smoker     Packs/day: 1.00    Smokeless tobacco: Never Used   Substance and Sexual Activity    Alcohol use: Yes     Comment: social    Drug use: No    Sexual activity: Not on file   Other Topics Concern    Not on file   Social History Narrative    Not on file     Social Determinants of Health     Financial Resource Strain:     Difficulty of Paying Living Expenses: Not on file   Food Insecurity:     Worried About Running Out of Food in the Last Year: Not on file    Kizzy of Food in the Last Year: Not on file   Transportation Needs:     Lack of Transportation (Medical): Not on file    Lack of Transportation (Non-Medical):  Not on file   Physical Activity:     Days of Exercise per Week: Not on file    Minutes of Exercise per Session: Not on file   Stress:     Feeling of Stress : Not on file   Social Connections:     Frequency of Communication with Friends and Family: Not on file    Frequency of Social Gatherings with Friends and Family: Not on file    Attends Spiritism Services: Not on file    Active Member of Clubs or Organizations: Not on file    Attends Club or Organization Meetings: Not on file    Marital Status: Not on file   Intimate Partner Violence:     Fear of Current or Ex-Partner: Not on file    Emotionally Abused: Not on file    Physically Abused: Not on file    Sexually Abused: Not on file   Housing Stability:     Unable to Pay for Housing in the Last Year: Not on file    Number of [place] in the Last Year: Not on file    Unstable Housing in the Last Year: Not on file         Review of Systems   Constitutional: Negative for chills, diaphoresis, fatigue and fever. HENT: Negative for congestion, mouth sores, nosebleeds, postnasal drip, rhinorrhea, sinus pressure, sneezing, sore throat, trouble swallowing and voice change. Eyes: Negative for discharge, itching and visual disturbance. Respiratory: Positive for cough, shortness of breath and wheezing. Negative for chest tightness. Cardiovascular: Negative for chest pain, palpitations and leg swelling. Gastrointestinal: Negative for abdominal pain, diarrhea, nausea and vomiting. Genitourinary: Negative for difficulty urinating and hematuria. Musculoskeletal: Negative for arthralgias, joint swelling and myalgias. Skin: Negative for rash. Allergic/Immunologic: Negative for environmental allergies and food allergies. Neurological: Negative for dizziness, tremors, weakness and headaches.    Psychiatric/Behavioral: Negative for behavioral problems and sleep disturbance.         :     Vitals:    04/20/22 1003   BP: 139/86   Pulse: 81   Temp: 98.2 °F (36.8 °C)   SpO2: 93%   Weight: 158 lb (71.7 kg)   Height: 5' (1.524 m) Wt Readings from Last 3 Encounters:   04/20/22 158 lb (71.7 kg)   12/20/21 162 lb (73.5 kg)   08/02/21 160 lb (72.6 kg)         Physical Exam  Constitutional:       General: She is not in acute distress. Appearance: She is well-developed. She is obese. She is not diaphoretic. HENT:      Head: Normocephalic and atraumatic. Nose: Nose normal.   Eyes:      Pupils: Pupils are equal, round, and reactive to light. Neck:      Thyroid: No thyromegaly. Vascular: No JVD. Trachea: No tracheal deviation. Cardiovascular:      Rate and Rhythm: Normal rate and regular rhythm. Heart sounds: No murmur heard. No friction rub. No gallop. Pulmonary:      Effort: No respiratory distress. Breath sounds: Wheezing present. No rales. Comments: diminished Breath sound bilaterally. Chest:      Chest wall: No tenderness. Abdominal:      General: There is no distension. Tenderness: There is no abdominal tenderness. There is no rebound. Musculoskeletal:         General: Normal range of motion. Lymphadenopathy:      Cervical: No cervical adenopathy. Skin:     General: Skin is warm and dry. Neurological:      Mental Status: She is alert and oriented to person, place, and time.       Coordination: Coordination normal.         Current Outpatient Medications   Medication Sig Dispense Refill    predniSONE (DELTASONE) 10 MG tablet 4 tablets daily for 4 days, 3 tablets daily for 4 days, 2 tablets daily for 4 days, then 1 tablet daily for 4 days 40 tablet 0    azithromycin (ZITHROMAX) 250 MG tablet Take 1 tablet by mouth See Admin Instructions for 5 days 500mg on day 1 followed by 250mg on days 2 - 5 6 tablet 1    albuterol-ipratropium (COMBIVENT RESPIMAT)  MCG/ACT AERS inhaler INHALE 1 INHALATION BY  MOUTH EVERY 6 HOURS 12 g 3    albuterol sulfate  (90 Base) MCG/ACT inhaler USE 2 INHALATIONS BY MOUTH  4 TIMES DAILY AS NEEDED FOR WHEEZING 25.5 g 3    albuterol (PROVENTIL) (2.5 MG/3ML) 0.083% nebulizer solution Take 3 mLs by nebulization every 6 hours as needed for Wheezing 120 each 5    Respiratory Therapy Supplies KIT As directed 1 kit 0    ipratropium-albuterol (DUONEB) 0.5-2.5 (3) MG/3ML SOLN nebulizer solution Inhale 3 mLs into the lungs 4 times daily 360 mL 3     No current facility-administered medications for this visit. Results for orders placed in visit on 07/29/21    XR CHEST STANDARD (2 VW)    Narrative  DIAGNOSIS:J44.9 Chronic obstructive pulmonary disease, unspecified COPD type ([address] ICD10    COMPARISON: August 21, 2020    TECHNIQUE: PA and lateral chest    FINDINGS: The lungs are hyperinflated and there is coarsening of the interstitium. Flattening of the diaphragm and increased AP diameter is seen. The lungs contain no focal infiltrate, pleural effusion, consolidation or pneumothorax. The cardiac  silhouette is not enlarged. Calcifications are seen in the thoracic aorta. There are degenerative changes in the dorsal spine. Impression  No acute cardiopulmonary process, findings are suggestive of COPD      Results for orders placed during the hospital encounter of 08/20/20    XR CHEST (2 VW)    Narrative  X-RAY: A CHEST, 2 VIEW(S):    CLINICAL HISTORY:  J44.9 Chronic obstructive pulmonary disease, unspecified COPD type ([address] ICD10  , no complaints today. DATE: 8/20/2020 8:10 AM    COMPARISONS: 8/24/2019    FINDINGS: Normal cardiac silhouette. There are atherosclerotic calcifications in the aortic arch. Lungs are clear without consolidation. No pleural effusion or pneumothorax. There are mild degenerative changes in spine. Impression  NO ACUTE CARDIOPULMONARY ABNORMALITY. NO CHANGE.       Results for orders placed during the hospital encounter of 08/24/19    XR CHEST STANDARD (2 VW)    Narrative  EXAMINATION: XR CHEST (2 VW)    REASON FOR EXAM:J44.9 Chronic obstructive pulmonary disease, unspecified COPD type ([address] ICD10    FINDINGS: Plain films reveal heart and mediastinal structures within normal limits and the lung fields free of active disease. Bones and soft tissues intact. No change from June 6, 2018. Impression  NO ACTIVE DISEASE IN THE CHEST. Assessment/Plan:     1. COPD with exacerbation ([address]  She is using nebulizer albuterol and ipatropium QID prn , Combivent respimat QID , albuterol HFA. C/o shortness of breath with exertion. C/o off and on Wheezing. C/o Cough with  white Sputum. Continue bronchodilator as before . Prednisone taper dose    - predniSONE (DELTASONE) 10 MG tablet; 4 tablets daily for 4 days, 3 tablets daily for 4 days, 2 tablets daily for 4 days, then 1 tablet daily for 4 days  Dispense: 40 tablet; Refill: 0  - albuterol-ipratropium (COMBIVENT RESPIMAT)  MCG/ACT AERS inhaler; INHALE 1 INHALATION BY  MOUTH EVERY 6 HOURS  Dispense: 12 g; Refill: 3  - albuterol sulfate  (90 Base) MCG/ACT inhaler; USE 2 INHALATIONS BY MOUTH  4 TIMES DAILY AS NEEDED FOR WHEEZING  Dispense: 25.5 g; Refill: 3    2. Acute bronchitis, unspecified organism  - azithromycin (ZITHROMAX) 250 MG tablet; Take 1 tablet by mouth See Admin Instructions for 5 days 500mg on day 1 followed by 250mg on days 2 - 5  Dispense: 6 tablet; Refill: 1    3. Tobacco abuse  She is advised try to quit smoking. Risks related to smoking explained to the patient. Different ways to help to quit smoking were discussed today. She is still smoking less than 1 ppd. She does not want to have CT chest done. Return in about 4 months (around 8/20/2022) for COPD.       Fidel Ly MD

## 2022-04-22 DIAGNOSIS — J44.1 COPD WITH EXACERBATION (HCC): ICD-10-CM

## 2022-04-22 NOTE — TELEPHONE ENCOUNTER
Please approve or deny this refill request. The order is pended. Thank you.     LOV 4/20/2022    Next Visit Date:  Future Appointments   Date Time Provider Kaylie Hodge   8/22/2022  9:45 AM Bartolome Ramirez MD [facility]       Patient [facility]

## 2022-04-23 RX ORDER — ALBUTEROL SULFATE 90 UG/1
AEROSOL, METERED RESPIRATORY (INHALATION)
Qty: 25.5 G | Refills: 3 | Status: SHIPPED | OUTPATIENT
Start: 2022-04-23

## 2022-07-18 DIAGNOSIS — J44.1 COPD WITH EXACERBATION (HCC): ICD-10-CM

## 2022-07-18 RX ORDER — PREDNISONE 10 MG/1
TABLET ORAL
Qty: 40 TABLET | Refills: 0 | Status: SHIPPED | OUTPATIENT
Start: 2022-07-18 | End: 2022-08-22 | Stop reason: ALTCHOICE

## 2022-07-18 NOTE — TELEPHONE ENCOUNTER
PT IS HAVING A COPD FLARE UP.             Rx requested:  Requested Prescriptions     Pending Prescriptions Disp Refills    predniSONE (DELTASONE) 10 MG tablet 40 tablet 0     Si tablets daily for 4 days, 3 tablets daily for 4 days, 2 tablets daily for 4 days, then 1 tablet daily for 4 days       Last Office Visit:   2022      Next Visit Date:  Future Appointments   Date Time Provider Kaylie Hodge   2022  9:45 AM Alma Simmons MD [address] [place] [address]

## 2022-08-22 ENCOUNTER — OFFICE VISIT (OUTPATIENT)
Dept: PULMONOLOGY | Age: 72
End: 2022-08-22
Payer: MEDICARE

## 2022-08-22 VITALS
OXYGEN SATURATION: 91 % | DIASTOLIC BLOOD PRESSURE: 74 MMHG | HEART RATE: 98 BPM | HEIGHT: 60 IN | SYSTOLIC BLOOD PRESSURE: 134 MMHG | WEIGHT: 161 LBS | TEMPERATURE: 97.4 F | BODY MASS INDEX: 31.61 KG/M2 | RESPIRATION RATE: 16 BRPM

## 2022-08-22 DIAGNOSIS — R09.02 HYPOXIA: Primary | ICD-10-CM

## 2022-08-22 DIAGNOSIS — J44.9 CHRONIC OBSTRUCTIVE PULMONARY DISEASE, UNSPECIFIED COPD TYPE (HCC): ICD-10-CM

## 2022-08-22 DIAGNOSIS — Z72.0 TOBACCO ABUSE: ICD-10-CM

## 2022-08-22 PROCEDURE — 1123F ACP DISCUSS/DSCN MKR DOCD: CPT | Performed by: INTERNAL MEDICINE

## 2022-08-22 PROCEDURE — G8417 CALC BMI ABV UP PARAM F/U: HCPCS | Performed by: INTERNAL MEDICINE

## 2022-08-22 PROCEDURE — G8400 PT W/DXA NO RESULTS DOC: HCPCS | Performed by: INTERNAL MEDICINE

## 2022-08-22 PROCEDURE — G8427 DOCREV CUR MEDS BY ELIG CLIN: HCPCS | Performed by: INTERNAL MEDICINE

## 2022-08-22 PROCEDURE — 3017F COLORECTAL CA SCREEN DOC REV: CPT | Performed by: INTERNAL MEDICINE

## 2022-08-22 PROCEDURE — 4004F PT TOBACCO SCREEN RCVD TLK: CPT | Performed by: INTERNAL MEDICINE

## 2022-08-22 PROCEDURE — 3023F SPIROM DOC REV: CPT | Performed by: INTERNAL MEDICINE

## 2022-08-22 PROCEDURE — 1090F PRES/ABSN URINE INCON ASSESS: CPT | Performed by: INTERNAL MEDICINE

## 2022-08-22 PROCEDURE — 99214 OFFICE O/P EST MOD 30 MIN: CPT | Performed by: INTERNAL MEDICINE

## 2022-08-22 RX ORDER — IPRATROPIUM BROMIDE AND ALBUTEROL SULFATE 2.5; .5 MG/3ML; MG/3ML
1 SOLUTION RESPIRATORY (INHALATION) 4 TIMES DAILY
Qty: 360 ML | Refills: 3 | Status: SHIPPED | OUTPATIENT
Start: 2022-08-22

## 2022-08-22 RX ORDER — FLUTICASONE PROPIONATE 110 UG/1
2 AEROSOL, METERED RESPIRATORY (INHALATION) 2 TIMES DAILY
COMMUNITY
Start: 2022-08-11 | End: 2023-08-11

## 2022-08-22 ASSESSMENT — ENCOUNTER SYMPTOMS
ABDOMINAL PAIN: 0
SHORTNESS OF BREATH: 1
CHEST TIGHTNESS: 0
VOICE CHANGE: 0
SORE THROAT: 0
TROUBLE SWALLOWING: 0
DIARRHEA: 0
WHEEZING: 1
RHINORRHEA: 0
COUGH: 1
NAUSEA: 0
EYE ITCHING: 0
SINUS PRESSURE: 0
EYE DISCHARGE: 0
VOMITING: 0

## 2022-08-22 NOTE — PROGRESS NOTES
Subjective:     Rafael Orr is a 67 y.o. female who complains today of:     Chief Complaint   Patient presents with    COPD     Patient uses 2 liter of oxygen daily. HPI  She said she was having  more shore shortness due to weather . She is on 2 lit  O2 via NC . With sleep and prn   She is using nebulizer albuterol and ipatropium QID prn ,   Combivent respimat QID , albuterol HFA. C/o shortness of breath with exertion, she was seen by PCP, she had CXR  Interstitial prominence suggesting airways inflammation. No pneumonia done at [facility] 8/11/22   identified  and she was given Zithromax  and Flovent HFA . She does not like flovent HFA , She does not want to use steroid inhaler  . C/o Wheezing. C/o Cough with clear to sometime Sputum. No Hemoptysis. No Chest tightness. No Chest pain with radiation  or pleuritic pain. No  leg edema. No orthopnea. No Fever or chills. No Rhinorrhea and postnasal drip. She is still smoking less than 1 ppd. She still does not want to have CT chest lung screening done. Allergies:  Patient has no known allergies. Past Medical History:   Diagnosis Date    Chronic bronchitis (HCC)     COPD (chronic obstructive pulmonary disease) ([address]     Lung disease      No past surgical history on file.   Family History   Problem Relation Age of Onset    Brain Cancer Father      Social History     Socioeconomic History    Marital status: Single     Spouse name: Not on file    Number of children: Not on file    Years of education: Not on file    Highest education level: Not on file   Occupational History    Not on file   Tobacco Use    Smoking status: Every Day     Packs/day: 1.00     Years: 53.00     Pack years: 53.00     Types: Cigarettes    Smokeless tobacco: Never   Substance and Sexual Activity    Alcohol use: Yes     Comment: social    Drug use: No    Sexual activity: Not on file   Other Topics Concern    Not on file   Social History Narrative    Not on file Social Determinants of Health     Financial Resource Strain: Not on file   Food Insecurity: Not on file   Transportation Needs: Not on file   Physical Activity: Not on file   Stress: Not on file   Social Connections: Not on file   Intimate Partner Violence: Not on file   Housing Stability: Not on file         Review of Systems   Constitutional:  Negative for chills, diaphoresis, fatigue and fever. HENT:  Negative for congestion, mouth sores, nosebleeds, postnasal drip, rhinorrhea, sinus pressure, sneezing, sore throat, trouble swallowing and voice change. Eyes:  Negative for discharge, itching and visual disturbance. Respiratory:  Positive for cough, shortness of breath and wheezing. Negative for chest tightness. Cardiovascular:  Negative for chest pain, palpitations and leg swelling. Gastrointestinal:  Negative for abdominal pain, diarrhea, nausea and vomiting. Genitourinary:  Negative for difficulty urinating and hematuria. Musculoskeletal:  Negative for arthralgias, joint swelling and myalgias. Skin:  Negative for rash. Allergic/Immunologic: Negative for environmental allergies and food allergies. Neurological:  Negative for dizziness, tremors, weakness and headaches. Psychiatric/Behavioral:  Negative for behavioral problems and sleep disturbance.        :     Vitals:    08/22/22 0949   BP: 134/74   Site: Right Upper Arm   Position: Sitting   Cuff Size: Large Adult   Pulse: 98   Resp: 16   Temp: 97.4 °F (36.3 °C)   TempSrc: Temporal   SpO2: 91%   Weight: 161 lb (73 kg)   Height: 5' (1.524 m)     Wt Readings from Last 3 Encounters:   08/22/22 161 lb (73 kg)   04/20/22 158 lb (71.7 kg)   12/20/21 162 lb (73.5 kg)         Physical Exam  Constitutional:       General: She is not in acute distress. Appearance: She is well-developed. She is not diaphoretic. HENT:      Head: Normocephalic and atraumatic.       Nose: Nose normal.   Eyes:      Pupils: Pupils are equal, round, and reactive to light. Neck:      Thyroid: No thyromegaly. Vascular: No JVD. Trachea: No tracheal deviation. Cardiovascular:      Rate and Rhythm: Normal rate and regular rhythm. Heart sounds: No murmur heard. No friction rub. No gallop. Pulmonary:      Effort: No respiratory distress. Breath sounds: Wheezing present. No rales. Comments: diminished Breath sound bilaterally. Chest:      Chest wall: No tenderness. Abdominal:      General: There is no distension. Tenderness: There is no abdominal tenderness. There is no rebound. Musculoskeletal:         General: Normal range of motion. Lymphadenopathy:      Cervical: No cervical adenopathy. Skin:     General: Skin is warm and dry. Neurological:      Mental Status: She is alert and oriented to person, place, and time. Coordination: Coordination normal.   Psychiatric:         Mood and Affect: Mood normal.         Behavior: Behavior normal.       Current Outpatient Medications   Medication Sig Dispense Refill    albuterol-ipratropium (COMBIVENT RESPIMAT)  MCG/ACT AERS inhaler INHALE 1 INHALATION BY  MOUTH EVERY 6 HOURS 12 g 3    ipratropium-albuterol (DUONEB) 0.5-2.5 (3) MG/3ML SOLN nebulizer solution Inhale 3 mLs into the lungs 4 times daily 360 mL 3    albuterol sulfate  (90 Base) MCG/ACT inhaler USE 2 INHALATIONS BY MOUTH  4 TIMES DAILY AS NEEDED FOR WHEEZING 25.5 g 3    Respiratory Therapy Supplies KIT As directed 1 kit 0    fluticasone (FLOVENT HFA) 110 MCG/ACT inhaler Inhale 2 puffs into the lungs 2 times daily       No current facility-administered medications for this visit. Results for orders placed in visit on 07/29/21    XR CHEST STANDARD (2 VW)    Narrative  DIAGNOSIS:J44.9 Chronic obstructive pulmonary disease, unspecified COPD type ([facility] ICD10    COMPARISON: August 21, 2020    TECHNIQUE: PA and lateral chest    FINDINGS: The lungs are hyperinflated and there is coarsening of the interstitium. Flattening of the diaphragm and increased AP diameter is seen. The lungs contain no focal infiltrate, pleural effusion, consolidation or pneumothorax. The cardiac  silhouette is not enlarged. Calcifications are seen in the thoracic aorta. There are degenerative changes in the dorsal spine. Impression  No acute cardiopulmonary process, findings are suggestive of COPD      Results for orders placed during the hospital encounter of 08/20/20    XR CHEST (2 VW)    Narrative  X-RAY: A CHEST, 2 VIEW(S):    CLINICAL HISTORY:  J44.9 Chronic obstructive pulmonary disease, unspecified COPD type ([address] ICD10  , no complaints today. DATE: 8/20/2020 8:10 AM    COMPARISONS: 8/24/2019    FINDINGS: Normal cardiac silhouette. There are atherosclerotic calcifications in the aortic arch. Lungs are clear without consolidation. No pleural effusion or pneumothorax. There are mild degenerative changes in spine. Impression  NO ACUTE CARDIOPULMONARY ABNORMALITY. NO CHANGE. Results for orders placed during the hospital encounter of 08/24/19    XR CHEST STANDARD (2 VW)    Narrative  EXAMINATION: XR CHEST (2 VW)    REASON FOR EXAM:J44.9 Chronic obstructive pulmonary disease, unspecified COPD type ([address] ICD10    FINDINGS: Plain films reveal heart and mediastinal structures within normal limits and the lung fields free of active disease. Bones and soft tissues intact. No change from June 6, 2018. Impression  NO ACTIVE DISEASE IN THE CHEST. Assessment/Plan:     1. Chronic obstructive pulmonary disease, unspecified COPD type ([address]  She said she was having  more shore shortness due to weather . She is on 2 lit  O2 via NC . With sleep and prn She is using nebulizer albuterol and ipatropium QID prn , Combivent respimat QID , albuterol HFA. C/o shortness of breath with exertion, C/o Wheezing. C/o Cough with clear to sometime Sputum. She was seen by PCP, she had CXR  Interstitial prominence suggesting airways inflammation.   No pneumonia done at [facility] 8/11/22 she was given Zithromax  and Flovent HFA . She does not like flovent HFA , She does not want to use steroid inhaler. - albuterol-ipratropium (COMBIVENT RESPIMAT)  MCG/ACT AERS inhaler; INHALE 1 INHALATION BY  MOUTH EVERY 6 HOURS  Dispense: 12 g; Refill: 3  - ipratropium-albuterol (DUONEB) 0.5-2.5 (3) MG/3ML SOLN nebulizer solution; Inhale 3 mLs into the lungs 4 times daily  Dispense: 360 mL; Refill: 3    2. Hypoxia  She is on 2 lit  O2 via NC . With sleep, continue O2 to keep saturation 90% or above    3. Tobacco abuse  She is advised try to quit smoking. Risks related to smoking explained to the patient. Different ways to help to quit smoking were discussed today. She is still smoking less than 1 ppd. She still does not want to have CT chest lung screening done. Return in about 4 months (around 12/22/2022) for COPD.       Alondra Field MD

## 2022-09-26 DIAGNOSIS — J44.9 CHRONIC OBSTRUCTIVE PULMONARY DISEASE, UNSPECIFIED COPD TYPE (HCC): ICD-10-CM

## 2022-09-26 NOTE — TELEPHONE ENCOUNTER
Rx requested:  Requested Prescriptions      No prescriptions requested or ordered in this encounter       Last Office Visit:   8/22/2022      Next Visit Date:  Future Appointments   Date Time Provider Kaylie Hodge   12/22/2022 10:15 AM Sumit Pierre MD [address]    Needs 1 to local pharmacy until Lamoure comes in. Patient is totally out of this inhaler.

## 2022-10-14 ENCOUNTER — TELEPHONE (OUTPATIENT)
Dept: PULMONOLOGY | Age: 72
End: 2022-10-14

## 2022-10-14 DIAGNOSIS — J44.9 CHRONIC OBSTRUCTIVE PULMONARY DISEASE, UNSPECIFIED COPD TYPE (HCC): Primary | ICD-10-CM

## 2022-10-14 NOTE — TELEPHONE ENCOUNTER
PATIENT IS ASKING FOR AN ORDER FOR A HUMIDIFIER FOR HER OXYGEN FROM [address] [place] [address].

## 2022-12-30 ENCOUNTER — TELEMEDICINE (OUTPATIENT)
Dept: PULMONOLOGY | Age: 72
End: 2022-12-30
Payer: MEDICARE

## 2022-12-30 DIAGNOSIS — J20.9 ACUTE BRONCHITIS, UNSPECIFIED ORGANISM: ICD-10-CM

## 2022-12-30 DIAGNOSIS — Z72.0 TOBACCO ABUSE DISORDER: ICD-10-CM

## 2022-12-30 DIAGNOSIS — R09.02 HYPOXIA: ICD-10-CM

## 2022-12-30 DIAGNOSIS — J44.1 COPD WITH EXACERBATION (HCC): Primary | ICD-10-CM

## 2022-12-30 PROCEDURE — 99443 PR PHYS/QHP TELEPHONE EVALUATION 21-30 MIN: CPT | Performed by: INTERNAL MEDICINE

## 2022-12-30 RX ORDER — PREDNISONE 10 MG/1
TABLET ORAL
Qty: 40 TABLET | Refills: 0 | Status: SHIPPED | OUTPATIENT
Start: 2022-12-30

## 2022-12-30 RX ORDER — AZITHROMYCIN 250 MG/1
250 TABLET, FILM COATED ORAL SEE ADMIN INSTRUCTIONS
Qty: 6 TABLET | Refills: 1 | Status: SHIPPED | OUTPATIENT
Start: 2022-12-30 | End: 2023-01-04

## 2022-12-30 ASSESSMENT — ENCOUNTER SYMPTOMS
DIARRHEA: 0
EYE ITCHING: 0
TROUBLE SWALLOWING: 0
SINUS PRESSURE: 0
NAUSEA: 0
RHINORRHEA: 0
VOICE CHANGE: 0
SORE THROAT: 0
CHEST TIGHTNESS: 0
ABDOMINAL PAIN: 0
VOMITING: 0
EYE DISCHARGE: 0
SHORTNESS OF BREATH: 1
WHEEZING: 1
COUGH: 1

## 2022-12-30 NOTE — PROGRESS NOTES
Giovanna Cochran (:  1950) is a Established patient, here for evaluation of the following:    Assessment & Plan   Below is the assessment and plan developed based on review of pertinent history, physical exam, labs, studies, and medications. 1. COPD with exacerbation ([address]  2. Acute bronchitis, unspecified organism  3. Hypoxia  4. Tobacco abuse disorder    She is feeling sick for last 3 to 4 days with increased shortness of breath cough with clear mucus and wheezing. Currently on albuterol and Atrovent nebulizer with 4 times daily as needed and combivent respimat 4 times daily and albuterol HFA as needed. She said she had a fall and fractured rib first week of 2022. She is doing better regarding rib fracture. She is still smoking 1 pack/day. She is using 2 L via nasal cannula for sleep and prn but due to having more short of breath She is using more often. Advised to continue O2 and bronchodilator therapy as before. I will give her Z-Chevy  if condition worsen then need chest x-ray. Recommend to go to ER or urgent care. Return in about 4 weeks (around 2023) for COPD, hypoxia on O2. Subjective     She had fall fractured Rib 1 st week of dec 2022  She is sick for 3-4 days. She is on 2 lit  O2 via NC . With sleep and prn   She is using nebulizer albuterol and ipatropium QID prn ,   Combivent respimat QID , albuterol HFA. Complaining increased shortness of breath no fever, cough with clear mucus. C/o Wheezing. No Hemoptysis. No Chest tightness. No Chest pain with radiation  or pleuritic pain. No  leg edema. No orthopnea. No Fever or chills. No Rhinorrhea and postnasal drip. She is still smoking less than 1 ppd. Review of Systems   Constitutional:  Negative for chills, diaphoresis, fatigue and fever. HENT:  Negative for congestion, mouth sores, nosebleeds, postnasal drip, rhinorrhea, sinus pressure, sneezing, sore throat, trouble swallowing and voice change. Eyes:  Negative for discharge, itching and visual disturbance. Respiratory:  Positive for cough, shortness of breath and wheezing. Negative for chest tightness. Cardiovascular:  Negative for chest pain, palpitations and leg swelling. Gastrointestinal:  Negative for abdominal pain, diarrhea, nausea and vomiting. Genitourinary:  Negative for difficulty urinating and hematuria. Musculoskeletal:  Negative for arthralgias, joint swelling and myalgias. Skin:  Negative for rash. Allergic/Immunologic: Negative for environmental allergies and food allergies. Neurological:  Negative for dizziness, tremors, weakness and headaches. Psychiatric/Behavioral:  Negative for behavioral problems and sleep disturbance. Objective   Patient-Reported Vitals  No data recorded     Physical Exam phone visit          On this date 12/30/2022 I have spent 22 minutes reviewing previous notes, test results and face to face (virtual) with the patient discussing the diagnosis and importance of compliance with the treatment plan as well as documenting on the day of the visit. Benjyinojuan j Titi, was evaluated through a synchronous (real-time) audio-video encounter. The patient (or guardian if applicable) is aware that this is a billable service, which includes applicable co-pays. This Virtual Visit was conducted with patient's (and/or legal guardian's) consent. The visit was conducted pursuant to the emergency declaration under the [address] [place] [address], [address] [place] [address] authority and the [employer] and [employer] General Act. Patient identification was verified, and a caregiver was present when appropriate. The patient was located at Home: [address] [place] [address]  [address] [address]. Provider was located at [place] [address]): [address] [place] [address] Jamie,  [id number] [place] [address].         --Ely Magana MD

## 2022-12-31 DIAGNOSIS — J44.9 CHRONIC OBSTRUCTIVE PULMONARY DISEASE, UNSPECIFIED COPD TYPE (HCC): ICD-10-CM

## 2023-01-04 RX ORDER — IPRATROPIUM BROMIDE AND ALBUTEROL SULFATE 2.5; .5 MG/3ML; MG/3ML
1 SOLUTION RESPIRATORY (INHALATION) 4 TIMES DAILY
Qty: 360 ML | Refills: 3 | Status: SHIPPED | OUTPATIENT
Start: 2023-01-04

## 2023-01-04 NOTE — TELEPHONE ENCOUNTER
Please approve or deny this request.    Rx requested:  Requested Prescriptions     Pending Prescriptions Disp Refills    ipratropium-albuterol (DUONEB) 0.5-2.5 (3) MG/3ML SOLN nebulizer solution [Pharmacy Med Name: IPRAT-ALBUT 0.5-3(2.5) MG/3 ML] 360 mL 3     Sig: INHALE 3 MLS INTO THE LUNGS 4 TIMES DAILY         Last Office Visit:   12/30/2022      Next Visit Date:  Future Appointments   Date Time Provider Kaylie Hodge   1/26/2023  9:45 AM Vitaliy Stanley MD [facility]

## 2023-01-26 ENCOUNTER — TELEMEDICINE (OUTPATIENT)
Dept: PULMONOLOGY | Age: 73
End: 2023-01-26
Payer: MEDICARE

## 2023-01-26 DIAGNOSIS — J44.9 CHRONIC OBSTRUCTIVE PULMONARY DISEASE, UNSPECIFIED COPD TYPE (HCC): Primary | ICD-10-CM

## 2023-01-26 DIAGNOSIS — Z72.0 TOBACCO ABUSE DISORDER: ICD-10-CM

## 2023-01-26 DIAGNOSIS — R09.02 HYPOXIA: ICD-10-CM

## 2023-01-26 PROCEDURE — 99443 PR PHYS/QHP TELEPHONE EVALUATION 21-30 MIN: CPT | Performed by: INTERNAL MEDICINE

## 2023-01-26 RX ORDER — ALBUTEROL SULFATE 90 UG/1
AEROSOL, METERED RESPIRATORY (INHALATION)
Qty: 25.5 G | Refills: 3 | Status: SHIPPED | OUTPATIENT
Start: 2023-01-26

## 2023-01-26 ASSESSMENT — ENCOUNTER SYMPTOMS
VOMITING: 0
VOICE CHANGE: 0
SORE THROAT: 0
RHINORRHEA: 0
NAUSEA: 0
DIARRHEA: 0
CHEST TIGHTNESS: 0
SINUS PRESSURE: 0
COUGH: 1
ABDOMINAL PAIN: 0
TROUBLE SWALLOWING: 0
SHORTNESS OF BREATH: 1
EYE ITCHING: 0
EYE DISCHARGE: 0
WHEEZING: 1

## 2023-01-26 NOTE — PROGRESS NOTES
Svetlana Cunningham (:  1950) is a Established patient, here for evaluation of the following:    Assessment & Plan   Below is the assessment and plan developed based on review of pertinent history, physical exam, labs, studies, and medications. 1. Chronic obstructive pulmonary disease, unspecified COPD type ([address]  -     albuterol-ipratropium (COMBIVENT RESPIMAT)  MCG/ACT AERS inhaler; INHALE 1 INHALATION BY  MOUTH EVERY 6 HOURS, Disp-1 each, R-3Requesting 1 year supplyNormal  2. Hypoxia  3. Tobacco abuse disorder    She is currently on 2 L O2 via nasal cannula with sleep and prn. And getting nebulizer treatment with DuoNeb 4 times daily prn also on combivent respimat 4 times daily and albuterol HFA as needed. She said she does have a short of breath with exertion which is chronic at baseline no significant change in her cough. She also has some wheezing off and on. She is still smoking 1 pack a day advised try to quit smoking. She has a COVID fall with fractured rib and pelvic in 2022 but she is doing much better now. Continue present treatment plan. Follow-up in 3 months  Return in about 3 months (around 2023) for COPD, hypoxia on O2. Subjective   HPI  She had fall fractured Rib  and pelvic 1st week of dec 2022,doing better now   She is on 2 lit  O2 via NC . With sleep and during daytime  She is using nebulizer albuterol and ipatropium QID prn ,   Combivent respimat QID , albuterol HFA. C/o shortness of breath with exertion   C/o cough with clear mucus. C/o Wheezing. No Chest tightness. No Chest pain with radiation  or pleuritic pain. No  leg edema. No orthopnea. No Fever or chills. No Rhinorrhea and postnasal drip. She is still smoking less than 1 ppd. Review of Systems   Constitutional:  Negative for chills, diaphoresis, fatigue and fever.    HENT:  Negative for congestion, mouth sores, nosebleeds, postnasal drip, rhinorrhea, sinus pressure, sneezing, sore throat, trouble swallowing and voice change. Eyes:  Negative for discharge, itching and visual disturbance. Respiratory:  Positive for cough, shortness of breath and wheezing. Negative for chest tightness. Cardiovascular:  Negative for chest pain, palpitations and leg swelling. Gastrointestinal:  Negative for abdominal pain, diarrhea, nausea and vomiting. Genitourinary:  Negative for difficulty urinating and hematuria. Musculoskeletal:  Negative for arthralgias, joint swelling and myalgias. Skin:  Negative for rash. Allergic/Immunologic: Negative for environmental allergies and food allergies. Neurological:  Negative for dizziness, tremors, weakness and headaches. Psychiatric/Behavioral:  Negative for behavioral problems and sleep disturbance. Objective   Patient-Reported Vitals  No data recorded     Physical Exam phone visit          On this date 1/26/2023 I have spent 24 minutes reviewing previous notes, test results and face to face (virtual) with the patient discussing the diagnosis and importance of compliance with the treatment plan as well as documenting on the day of the visitJerome Hairstone Pauline, was evaluated through a synchronous (real-time) audio-video encounter. The patient (or guardian if applicable) is aware that this is a billable service, which includes applicable co-pays. This Virtual Visit was conducted with patient's (and/or legal guardian's) consent. The visit was conducted pursuant to the emergency declaration under the [address] [place] [address], [address] [place] [address] authority and the [employer] and [employer] General Act. Patient identification was verified, and a caregiver was present when appropriate. The patient was located at Home: [address] [place] [address] Road  [address] [address]. Provider was located at [place] [address]): [address].         --Thong Kirkpatrick MD

## 2023-04-16 DIAGNOSIS — J44.9 CHRONIC OBSTRUCTIVE PULMONARY DISEASE, UNSPECIFIED COPD TYPE (HCC): ICD-10-CM

## 2023-04-18 NOTE — TELEPHONE ENCOUNTER
Rx requested:  Requested Prescriptions     Pending Prescriptions Disp Refills    albuterol-ipratropium (COMBIVENT RESPIMAT)  MCG/ACT AERS inhaler [Pharmacy Med Name: Combivent Respimat  MCG/ACT Inhalation Aerosol Solution] 12 g 3     Sig: INHALE 1 INHALATION BY MOUTH  EVERY 6 HOURS       Last Office Visit:   1/26/2023      Next Visit Date:  Future Appointments   Date Time Provider Kaylie Martinezisti   5/26/2023 10:00 AM Kassie Guzman MD [facility]

## 2023-04-19 DIAGNOSIS — J44.9 CHRONIC OBSTRUCTIVE PULMONARY DISEASE, UNSPECIFIED COPD TYPE (HCC): ICD-10-CM

## 2023-04-19 RX ORDER — AZITHROMYCIN 250 MG/1
250 TABLET, FILM COATED ORAL SEE ADMIN INSTRUCTIONS
Qty: 6 TABLET | Refills: 1 | Status: SHIPPED | OUTPATIENT
Start: 2023-04-19 | End: 2023-04-24

## 2023-04-19 RX ORDER — PREDNISONE 10 MG/1
TABLET ORAL
Qty: 40 TABLET | Refills: 0 | Status: SHIPPED | OUTPATIENT
Start: 2023-04-19

## 2023-04-19 NOTE — TELEPHONE ENCOUNTER
Rx requested:  Requested Prescriptions     Pending Prescriptions Disp Refills    albuterol-ipratropium (COMBIVENT RESPIMAT)  MCG/ACT AERS inhaler 12 g 3     Sig: INHALE 1 INHALATION BY MOUTH  EVERY 6 HOURS       Last Office Visit:   1/26/2023      Next Visit Date:  Future Appointments   Date Time Provider Kaylie Hodge   5/26/2023 10:00 AM Sarah Patricio MD [facility]

## 2023-04-19 NOTE — TELEPHONE ENCOUNTER
PT IS HAVING A COPD FLARE UP AND REQUESTING A REFILL.       Rx requested:  Requested Prescriptions     Pending Prescriptions Disp Refills    predniSONE (DELTASONE) 10 MG tablet 40 tablet 0     Si tablets daily for 4 days, 3 tablets daily for 4 days, 2 tablets daily for 4 days, then 1 tablet daily for 4 days    azithromycin (ZITHROMAX) 250 MG tablet 6 tablet 1     Sig: Take 1 tablet by mouth See Admin Instructions for 5 days 500mg on day 1 followed by 250mg on days 2 - 5       Last Office Visit:   2023      Next Visit Date:  Future Appointments   Date Time Provider Kaylie Hodge   2023 10:00 AM Sarah Patricio MD [facility]

## 2023-05-26 ENCOUNTER — OFFICE VISIT (OUTPATIENT)
Dept: PULMONOLOGY | Age: 73
End: 2023-05-26
Payer: MEDICARE

## 2023-05-26 VITALS
WEIGHT: 145 LBS | HEART RATE: 90 BPM | TEMPERATURE: 97.1 F | DIASTOLIC BLOOD PRESSURE: 60 MMHG | OXYGEN SATURATION: 95 % | BODY MASS INDEX: 28.32 KG/M2 | SYSTOLIC BLOOD PRESSURE: 110 MMHG

## 2023-05-26 DIAGNOSIS — J44.1 COPD WITH EXACERBATION (HCC): Primary | ICD-10-CM

## 2023-05-26 DIAGNOSIS — R09.02 HYPOXIA: ICD-10-CM

## 2023-05-26 DIAGNOSIS — Z72.0 TOBACCO ABUSE DISORDER: ICD-10-CM

## 2023-05-26 PROCEDURE — 4004F PT TOBACCO SCREEN RCVD TLK: CPT | Performed by: INTERNAL MEDICINE

## 2023-05-26 PROCEDURE — 99214 OFFICE O/P EST MOD 30 MIN: CPT | Performed by: INTERNAL MEDICINE

## 2023-05-26 PROCEDURE — G8427 DOCREV CUR MEDS BY ELIG CLIN: HCPCS | Performed by: INTERNAL MEDICINE

## 2023-05-26 PROCEDURE — 3017F COLORECTAL CA SCREEN DOC REV: CPT | Performed by: INTERNAL MEDICINE

## 2023-05-26 PROCEDURE — G8417 CALC BMI ABV UP PARAM F/U: HCPCS | Performed by: INTERNAL MEDICINE

## 2023-05-26 PROCEDURE — G8400 PT W/DXA NO RESULTS DOC: HCPCS | Performed by: INTERNAL MEDICINE

## 2023-05-26 PROCEDURE — 3023F SPIROM DOC REV: CPT | Performed by: INTERNAL MEDICINE

## 2023-05-26 PROCEDURE — 1090F PRES/ABSN URINE INCON ASSESS: CPT | Performed by: INTERNAL MEDICINE

## 2023-05-26 PROCEDURE — 1123F ACP DISCUSS/DSCN MKR DOCD: CPT | Performed by: INTERNAL MEDICINE

## 2023-05-26 RX ORDER — PREDNISONE 10 MG/1
TABLET ORAL
Qty: 40 TABLET | Refills: 0 | Status: SHIPPED | OUTPATIENT
Start: 2023-05-26 | End: 2023-05-26

## 2023-05-26 RX ORDER — PREDNISONE 10 MG/1
TABLET ORAL
Qty: 40 TABLET | Refills: 0 | Status: SHIPPED | OUTPATIENT
Start: 2023-05-26

## 2023-05-26 ASSESSMENT — ENCOUNTER SYMPTOMS
DIARRHEA: 0
ABDOMINAL PAIN: 0
EYE ITCHING: 0
RHINORRHEA: 0
SINUS PRESSURE: 0
EYE DISCHARGE: 0
VOMITING: 0
TROUBLE SWALLOWING: 0
SORE THROAT: 0
NAUSEA: 0
COUGH: 1
SHORTNESS OF BREATH: 1
WHEEZING: 1
VOICE CHANGE: 0
CHEST TIGHTNESS: 0

## 2023-05-26 NOTE — PROGRESS NOTES
Subjective:     Migel Castle is a 75 y.o. female who complains today of:     Chief Complaint   Patient presents with    Follow-up     4m f/u on COPD, hypoxia        HPI    She is on 3 lit  O2 via NC . With sleep and during daytime  She is using nebulizer albuterol and ipatropium QID prn ,   Combivent respimat QID , albuterol HFA. C/o shortness of breath with exertion . C/o cough with clear mucus. C/o Wheezing more than before . No Chest tightness. No Chest pain with radiation  or pleuritic pain. No  leg edema. No orthopnea. No Fever or chills. No Rhinorrhea and postnasal drip. She is still smoking less than 3/4 ppd. Allergies:  Patient has no known allergies. Past Medical History:   Diagnosis Date    Chronic bronchitis (HCC)     COPD (chronic obstructive pulmonary disease) ([facility]     Lung disease      No past surgical history on file. Family History   Problem Relation Age of Onset    Brain Cancer Father      Social History     Socioeconomic History    Marital status: Single     Spouse name: Not on file    Number of children: Not on file    Years of education: Not on file    Highest education level: Not on file   Occupational History    Not on file   Tobacco Use    Smoking status: Every Day     Packs/day: 1.00     Years: 53.00     Pack years: 53.00     Types: Cigarettes    Smokeless tobacco: Never   Substance and Sexual Activity    Alcohol use: Yes     Comment: social    Drug use: No    Sexual activity: Not on file   Other Topics Concern    Not on file   Social History Narrative    Not on file     Social Determinants of Health     Financial Resource Strain: Not on file   Food Insecurity: Not on file   Transportation Needs: Not on file   Physical Activity: Not on file   Stress: Not on file   Social Connections: Not on file   Intimate Partner Violence: Not on file   Housing Stability: Not on file         Review of Systems   Constitutional:  Negative for chills, diaphoresis, fatigue and fever.

## 2023-09-05 DIAGNOSIS — J44.1 COPD WITH EXACERBATION (HCC): ICD-10-CM

## 2023-09-05 RX ORDER — PREDNISONE 10 MG/1
TABLET ORAL
Qty: 40 TABLET | Refills: 0 | Status: SHIPPED | OUTPATIENT
Start: 2023-09-05

## 2023-09-05 RX ORDER — AZITHROMYCIN 250 MG/1
250 TABLET, FILM COATED ORAL SEE ADMIN INSTRUCTIONS
Qty: 6 TABLET | Refills: 1 | Status: SHIPPED | OUTPATIENT
Start: 2023-09-05 | End: 2023-09-10

## 2023-09-05 NOTE — TELEPHONE ENCOUNTER
Rx requested:  Requested Prescriptions     Pending Prescriptions Disp Refills    predniSONE (DELTASONE) 10 MG tablet 40 tablet 0     Si tablets daily for 4 days, 3 tablets daily for 4 days, 2 tablets daily for 4 days, then 1 tablet daily for 4 days    azithromycin (ZITHROMAX) 250 MG tablet 6 tablet 1     Sig: Take 1 tablet by mouth See Admin Instructions for 5 days 500mg on day 1 followed by 250mg on days 2 - 5       Last Office Visit:   2023      Next Visit Date:  Future Appointments   Date Time Provider [facility] [address]   2023 10:00 AM Rabia Guerra MD [address] [place] [address]

## 2023-09-12 RX ORDER — ALBUTEROL SULFATE 90 UG/1
AEROSOL, METERED RESPIRATORY (INHALATION)
Qty: 34 G | Refills: 3 | Status: SHIPPED | OUTPATIENT
Start: 2023-09-12

## 2023-09-12 NOTE — TELEPHONE ENCOUNTER
Rx requested:  Requested Prescriptions     Pending Prescriptions Disp Refills    albuterol sulfate HFA (PROVENTIL;VENTOLIN;PROAIR) 108 (90 Base) MCG/ACT inhaler [Pharmacy Med Name: ALBUTEROL HFA 90MCG/ACT (PA)] 34 g 3     Sig: USE 2 INHALATIONS BY MOUTH 4  TIMES DAILY AS NEEDED FOR  WHEEZING       Last Office Visit:   5/26/2023      Next Visit Date:  Future Appointments   Date Time Provider [address]   9/29/2023 10:00 AM Rabia Guerra MD [facility]

## 2023-09-29 ENCOUNTER — OFFICE VISIT (OUTPATIENT)
Dept: PULMONOLOGY | Age: 73
End: 2023-09-29
Payer: MEDICARE

## 2023-09-29 VITALS
TEMPERATURE: 97.5 F | DIASTOLIC BLOOD PRESSURE: 68 MMHG | OXYGEN SATURATION: 91 % | SYSTOLIC BLOOD PRESSURE: 120 MMHG | WEIGHT: 146 LBS | BODY MASS INDEX: 28.51 KG/M2 | HEART RATE: 111 BPM

## 2023-09-29 DIAGNOSIS — J44.9 CHRONIC OBSTRUCTIVE PULMONARY DISEASE, UNSPECIFIED COPD TYPE (HCC): Primary | ICD-10-CM

## 2023-09-29 DIAGNOSIS — R09.02 HYPOXIA: ICD-10-CM

## 2023-09-29 DIAGNOSIS — Z72.0 TOBACCO ABUSE DISORDER: ICD-10-CM

## 2023-09-29 PROCEDURE — 99214 OFFICE O/P EST MOD 30 MIN: CPT | Performed by: INTERNAL MEDICINE

## 2023-09-29 PROCEDURE — 3017F COLORECTAL CA SCREEN DOC REV: CPT | Performed by: INTERNAL MEDICINE

## 2023-09-29 PROCEDURE — 1123F ACP DISCUSS/DSCN MKR DOCD: CPT | Performed by: INTERNAL MEDICINE

## 2023-09-29 PROCEDURE — G8427 DOCREV CUR MEDS BY ELIG CLIN: HCPCS | Performed by: INTERNAL MEDICINE

## 2023-09-29 PROCEDURE — 1090F PRES/ABSN URINE INCON ASSESS: CPT | Performed by: INTERNAL MEDICINE

## 2023-09-29 PROCEDURE — 4004F PT TOBACCO SCREEN RCVD TLK: CPT | Performed by: INTERNAL MEDICINE

## 2023-09-29 PROCEDURE — 3023F SPIROM DOC REV: CPT | Performed by: INTERNAL MEDICINE

## 2023-09-29 PROCEDURE — G8400 PT W/DXA NO RESULTS DOC: HCPCS | Performed by: INTERNAL MEDICINE

## 2023-09-29 PROCEDURE — G8417 CALC BMI ABV UP PARAM F/U: HCPCS | Performed by: INTERNAL MEDICINE

## 2023-09-29 RX ORDER — IPRATROPIUM BROMIDE AND ALBUTEROL SULFATE 2.5; .5 MG/3ML; MG/3ML
1 SOLUTION RESPIRATORY (INHALATION) 4 TIMES DAILY
Qty: 360 ML | Refills: 2 | Status: SHIPPED | OUTPATIENT
Start: 2023-09-29

## 2023-09-29 RX ORDER — ALBUTEROL SULFATE 90 UG/1
AEROSOL, METERED RESPIRATORY (INHALATION)
Qty: 18 G | Refills: 3 | Status: SHIPPED | OUTPATIENT
Start: 2023-09-29

## 2023-09-29 ASSESSMENT — ENCOUNTER SYMPTOMS
EYE ITCHING: 0
TROUBLE SWALLOWING: 0
DIARRHEA: 0
SORE THROAT: 0
ABDOMINAL PAIN: 0
CHEST TIGHTNESS: 0
VOICE CHANGE: 0
SINUS PRESSURE: 0
WHEEZING: 1
NAUSEA: 0
VOMITING: 0
RHINORRHEA: 0
EYE DISCHARGE: 0
COUGH: 1
SHORTNESS OF BREATH: 1

## 2023-09-30 NOTE — TELEPHONE ENCOUNTER
Pt called stating she needs new hoses and filter for her nebulizer.  Please advise, thanks s/w icu dr sayed, admit to icu.

## 2023-11-20 DIAGNOSIS — J44.1 COPD WITH EXACERBATION (HCC): ICD-10-CM

## 2023-11-20 RX ORDER — PREDNISONE 10 MG/1
TABLET ORAL
Qty: 40 TABLET | Refills: 0 | OUTPATIENT
Start: 2023-11-20

## 2023-11-20 RX ORDER — AZITHROMYCIN 250 MG/1
250 TABLET, FILM COATED ORAL SEE ADMIN INSTRUCTIONS
Qty: 6 TABLET | Refills: 1 | OUTPATIENT
Start: 2023-11-20 | End: 2023-11-25

## 2023-11-20 NOTE — TELEPHONE ENCOUNTER
PT IS HAVING A COPD FLARE UP AND REQUESTING REFILL.          Rx requested:  Requested Prescriptions     Pending Prescriptions Disp Refills    azithromycin (ZITHROMAX) 250 MG tablet 6 tablet 1     Sig: Take 1 tablet by mouth See Admin Instructions for 5 days 500mg on day 1 followed by 250mg on days 2 - 5    predniSONE (DELTASONE) 10 MG tablet 40 tablet 0     Si tablets daily for 4 days, 3 tablets daily for 4 days, 2 tablets daily for 4 days, then 1 tablet daily for 4 days       Last Office Visit:   2023      Next Visit Date:  Future Appointments   Date Time Provider [address]   2024 10:00 AM Jamilah Maldonado MD [facility]

## 2023-11-21 ENCOUNTER — TELEMEDICINE (OUTPATIENT)
Dept: PULMONOLOGY | Age: 73
End: 2023-11-21
Payer: MEDICARE

## 2023-11-21 DIAGNOSIS — J44.1 COPD WITH EXACERBATION (HCC): Primary | ICD-10-CM

## 2023-11-21 DIAGNOSIS — R09.02 HYPOXIA: ICD-10-CM

## 2023-11-21 DIAGNOSIS — Z72.0 TOBACCO ABUSE DISORDER: ICD-10-CM

## 2023-11-21 PROCEDURE — 99443 PR PHYS/QHP TELEPHONE EVALUATION 21-30 MIN: CPT | Performed by: INTERNAL MEDICINE

## 2023-11-21 RX ORDER — PREDNISONE 10 MG/1
TABLET ORAL
Qty: 40 TABLET | Refills: 0 | Status: SHIPPED | OUTPATIENT
Start: 2023-11-21

## 2023-11-21 RX ORDER — AZITHROMYCIN 250 MG/1
250 TABLET, FILM COATED ORAL SEE ADMIN INSTRUCTIONS
Qty: 6 TABLET | Refills: 1 | Status: SHIPPED | OUTPATIENT
Start: 2023-11-21 | End: 2023-11-26

## 2023-11-21 ASSESSMENT — ENCOUNTER SYMPTOMS
CHEST TIGHTNESS: 0
SINUS PRESSURE: 0
EYE ITCHING: 0
VOICE CHANGE: 0
COUGH: 1
RHINORRHEA: 0
TROUBLE SWALLOWING: 0
DIARRHEA: 0
SORE THROAT: 0
VOMITING: 0
EYE DISCHARGE: 0
WHEEZING: 1
SHORTNESS OF BREATH: 1
ABDOMINAL PAIN: 0
NAUSEA: 0

## 2023-11-21 NOTE — PROGRESS NOTES
Ricardo Calle, was evaluated through phone visit . The patient (or guardian if applicable) is aware that this is a billable service, which includes applicable co-pays. This Virtual Visit was conducted with patient's (and/or legal guardian's) consent. Patient identification was verified, and a caregiver was present when appropriate. The patient was located at Home: [facility]  Pranav Wallace  Provider was located at [facility] ([address] [place] [address]): [id number] [place] [address].S. [facility] [address] (:  1950) is a Established patient, presenting virtually for evaluation of the following:    Assessment & Plan   Below is the assessment and plan developed based on review of pertinent history, physical exam, labs, studies, and medications. 1. COPD with exacerbation ([address])  -     predniSONE (DELTASONE) 10 MG tablet; 4 tablets daily for 4 days, 3 tablets daily for 4 days, 2 tablets daily for 4 days, then 1 tablet daily for 4 days, Disp-40 tablet, R-0Normal  -     azithromycin (ZITHROMAX) 250 MG tablet; Take 1 tablet by mouth See Admin Instructions for 5 days 500mg on day 1 followed by 250mg on days 2 - 5, Disp-6 tablet, R-1Normal  2. Hypoxia  3. Tobacco abuse disorder  She is on 3 lit  O2 via NC  with sleep and during daytime. She is using nebulizer albuterol and ipatropium QID prn , Combivent respimat QID , albuterol HFA. C/o shortness of breath with exertion . C/o cough with milky white  mucus in morning . C/o Wheezing. No Chest tightness. She is still smoking less than 3/4 ppd. She is advised try to quit smoking. Risks related to smoking explained to the patient. Continue O2 and bronchodilator therapy as before. Return in about 4 months (around 3/21/2024). Subjective   HPI  She is on 3 lit  O2 via NC  with sleep and during daytime. She is using nebulizer albuterol and ipatropium QID prn ,   Combivent respimat QID , albuterol HFA. C/o shortness of breath with exertion .    C/o

## 2024-01-05 DIAGNOSIS — J44.9 CHRONIC OBSTRUCTIVE PULMONARY DISEASE, UNSPECIFIED COPD TYPE (HCC): ICD-10-CM

## 2024-01-05 DIAGNOSIS — J44.1 COPD WITH EXACERBATION (HCC): Primary | ICD-10-CM

## 2024-01-05 NOTE — PROGRESS NOTES
Rx requested:  Requested Prescriptions     Pending Prescriptions Disp Refills    albuterol-ipratropium (COMBIVENT RESPIMAT)  MCG/ACT AERS inhaler 12 g 3     Sig: INHALE 1 INHALATION BY MOUTH  EVERY 6 HOURS       Last Office Visit:   Visit date not found      Next Visit Date:  Future Appointments   Date Time Provider Department Center   3/21/2024 10:00 AM Stanley Mas MD [facility]

## 2024-02-01 ENCOUNTER — OFFICE VISIT (OUTPATIENT)
Dept: PULMONOLOGY | Age: 74
End: 2024-02-01
Payer: MEDICARE

## 2024-02-01 VITALS
BODY MASS INDEX: 28.51 KG/M2 | DIASTOLIC BLOOD PRESSURE: 70 MMHG | HEART RATE: 84 BPM | SYSTOLIC BLOOD PRESSURE: 104 MMHG | OXYGEN SATURATION: 92 % | HEIGHT: 60 IN

## 2024-02-01 DIAGNOSIS — J44.1 COPD WITH EXACERBATION (HCC): Primary | ICD-10-CM

## 2024-02-01 DIAGNOSIS — J96.11 CHRONIC RESPIRATORY FAILURE WITH HYPOXIA (HCC): ICD-10-CM

## 2024-02-01 DIAGNOSIS — Z01.811 PREOPERATIVE RESPIRATORY EXAMINATION: ICD-10-CM

## 2024-02-01 PROCEDURE — 1123F ACP DISCUSS/DSCN MKR DOCD: CPT | Performed by: INTERNAL MEDICINE

## 2024-02-01 PROCEDURE — G8400 PT W/DXA NO RESULTS DOC: HCPCS | Performed by: INTERNAL MEDICINE

## 2024-02-01 PROCEDURE — 3017F COLORECTAL CA SCREEN DOC REV: CPT | Performed by: INTERNAL MEDICINE

## 2024-02-01 PROCEDURE — G8417 CALC BMI ABV UP PARAM F/U: HCPCS | Performed by: INTERNAL MEDICINE

## 2024-02-01 PROCEDURE — 99214 OFFICE O/P EST MOD 30 MIN: CPT | Performed by: INTERNAL MEDICINE

## 2024-02-01 PROCEDURE — 3023F SPIROM DOC REV: CPT | Performed by: INTERNAL MEDICINE

## 2024-02-01 PROCEDURE — 1090F PRES/ABSN URINE INCON ASSESS: CPT | Performed by: INTERNAL MEDICINE

## 2024-02-01 PROCEDURE — 4004F PT TOBACCO SCREEN RCVD TLK: CPT | Performed by: INTERNAL MEDICINE

## 2024-02-01 PROCEDURE — G8484 FLU IMMUNIZE NO ADMIN: HCPCS | Performed by: INTERNAL MEDICINE

## 2024-02-01 PROCEDURE — G8427 DOCREV CUR MEDS BY ELIG CLIN: HCPCS | Performed by: INTERNAL MEDICINE

## 2024-02-01 RX ORDER — METOPROLOL SUCCINATE 25 MG/1
25 TABLET, EXTENDED RELEASE ORAL DAILY
COMMUNITY
Start: 2024-01-26

## 2024-02-01 RX ORDER — PREDNISONE 10 MG/1
TABLET ORAL
Qty: 40 TABLET | Refills: 0 | Status: SHIPPED | OUTPATIENT
Start: 2024-02-01

## 2024-02-01 RX ORDER — PREDNISONE 10 MG/1
TABLET ORAL
Qty: 40 TABLET | Refills: 0 | Status: SHIPPED | OUTPATIENT
Start: 2024-02-01 | End: 2024-02-01

## 2024-02-01 RX ORDER — ROSUVASTATIN CALCIUM 20 MG/1
20 TABLET, COATED ORAL NIGHTLY
COMMUNITY
Start: 2024-01-26

## 2024-02-01 NOTE — PROGRESS NOTES
Subjective:             Rosemary Campbell is a 73 y.o. female who complains today of:     Chief Complaint   Patient presents with    Surgical Clearance     Pt needs pulmonary clearance for abdominal aorta surgery       HPI  She is planning to go for surgery for abdominal aortic aneurysm.   She is on 3 lit  O2 via NC . With sleep and during daytime  She is using nebulizer albuterol and ipatropium QID prn ,   Combivent respimat QID , albuterol HFA.  She is still smoking less than 3/4 ppd.  C/o shortness of breath with exertion .   C/o cough with clear white  mucus in morning .  C/o Wheezing , with exertion ans hear when lie down.   No Chest tightness.   No Chest pain with radiation  or pleuritic pain.  No  leg edema.   No orthopnea.  No Fever or chills.   No Rhinorrhea and postnasal drip.    FVC is 1.23 liters which is 49 % of predicted, FEV1 is 0.89 liters which is 47 % of predicted, FEV1/FVC ratio is 72% which is mildly decreased Flow volume loop suggest an obstructive pattern, FEF 25-75% is 0.52 L/Sec, which is 32 % of predicted. Following bronchodilator therapy forced vital capacity improved by 13%, FEV1 improved by 10%, FEF 25-75% improved by 57%    Allergies:  Patient has no known allergies.  Past Medical History:   Diagnosis Date    Chronic bronchitis (HCC)     COPD (chronic obstructive pulmonary disease) (HCC)     Lung disease      No past surgical history on file.  Family History   Problem Relation Age of Onset    Brain Cancer Father      Social History     Socioeconomic History    Marital status: Single     Spouse name: Not on file    Number of children: Not on file    Years of education: Not on file    Highest education level: Not on file   Occupational History    Not on file   Tobacco Use    Smoking status: Every Day     Current packs/day: 1.00     Average packs/day: 1 pack/day for 53.0 years (53.0 ttl pk-yrs)     Types: Cigarettes    Smokeless tobacco: Never   Substance and Sexual Activity    Alcohol use:

## 2024-02-16 DIAGNOSIS — J44.9 CHRONIC OBSTRUCTIVE PULMONARY DISEASE, UNSPECIFIED COPD TYPE (HCC): ICD-10-CM

## 2024-02-19 RX ORDER — ALBUTEROL SULFATE 90 UG/1
AEROSOL, METERED RESPIRATORY (INHALATION)
Qty: 34 G | Refills: 3 | Status: SHIPPED | OUTPATIENT
Start: 2024-02-19

## 2024-02-19 NOTE — TELEPHONE ENCOUNTER
Rx requested:  Requested Prescriptions     Pending Prescriptions Disp Refills    albuterol sulfate HFA (PROVENTIL;VENTOLIN;PROAIR) 108 (90 Base) MCG/ACT inhaler [Pharmacy Med Name: ALBUTEROL HFA 90MCG/ACT (PA)] 34 g 3     Sig: USE 2 INHALATIONS BY MOUTH 4  TIMES DAILY AS NEEDED FOR  WHEEZING       Last Office Visit:   2/1/2024      Next Visit Date:  Future Appointments   Date Time Provider Department Center   3/21/2024 10:00 AM Stanley Mas MD [facility]   5/1/2024  9:30 AM Stanley Mas MD [facility]

## 2024-03-15 ENCOUNTER — HOSPITAL ENCOUNTER (OUTPATIENT)
Dept: GENERAL RADIOLOGY | Age: 74
End: 2024-03-15
Payer: MEDICARE

## 2024-03-15 DIAGNOSIS — J44.1 COPD WITH EXACERBATION (HCC): ICD-10-CM

## 2024-03-15 PROCEDURE — 71046 X-RAY EXAM CHEST 2 VIEWS: CPT

## 2024-03-21 ENCOUNTER — OFFICE VISIT (OUTPATIENT)
Dept: PULMONOLOGY | Age: 74
End: 2024-03-21
Payer: MEDICARE

## 2024-03-21 VITALS
SYSTOLIC BLOOD PRESSURE: 130 MMHG | HEART RATE: 82 BPM | DIASTOLIC BLOOD PRESSURE: 68 MMHG | BODY MASS INDEX: 28.51 KG/M2 | WEIGHT: 146 LBS | OXYGEN SATURATION: 92 %

## 2024-03-21 DIAGNOSIS — J44.9 CHRONIC OBSTRUCTIVE PULMONARY DISEASE, UNSPECIFIED COPD TYPE (HCC): Primary | ICD-10-CM

## 2024-03-21 DIAGNOSIS — Z72.0 TOBACCO ABUSE DISORDER: ICD-10-CM

## 2024-03-21 DIAGNOSIS — J96.11 CHRONIC RESPIRATORY FAILURE WITH HYPOXIA (HCC): ICD-10-CM

## 2024-03-21 DIAGNOSIS — J44.1 COPD WITH EXACERBATION (HCC): ICD-10-CM

## 2024-03-21 PROCEDURE — G8400 PT W/DXA NO RESULTS DOC: HCPCS | Performed by: INTERNAL MEDICINE

## 2024-03-21 PROCEDURE — 99214 OFFICE O/P EST MOD 30 MIN: CPT | Performed by: INTERNAL MEDICINE

## 2024-03-21 PROCEDURE — 1090F PRES/ABSN URINE INCON ASSESS: CPT | Performed by: INTERNAL MEDICINE

## 2024-03-21 PROCEDURE — 4004F PT TOBACCO SCREEN RCVD TLK: CPT | Performed by: INTERNAL MEDICINE

## 2024-03-21 PROCEDURE — G8484 FLU IMMUNIZE NO ADMIN: HCPCS | Performed by: INTERNAL MEDICINE

## 2024-03-21 PROCEDURE — G8417 CALC BMI ABV UP PARAM F/U: HCPCS | Performed by: INTERNAL MEDICINE

## 2024-03-21 PROCEDURE — 3023F SPIROM DOC REV: CPT | Performed by: INTERNAL MEDICINE

## 2024-03-21 PROCEDURE — G8427 DOCREV CUR MEDS BY ELIG CLIN: HCPCS | Performed by: INTERNAL MEDICINE

## 2024-03-21 PROCEDURE — 3017F COLORECTAL CA SCREEN DOC REV: CPT | Performed by: INTERNAL MEDICINE

## 2024-03-21 PROCEDURE — 1123F ACP DISCUSS/DSCN MKR DOCD: CPT | Performed by: INTERNAL MEDICINE

## 2024-03-21 NOTE — PROGRESS NOTES
Subjective:             Rosemary Campbell is a 74 y.o. female who complains today of:     Chief Complaint   Patient presents with    Surgical Clearance     Surgery clearance on abdominal aortic aneurysm     Follow-up     4m f/u on COPD, CXR results       HPI  She is not  planning to go for surgery for abdominal aortic aneurysm at this time and she said she may even not go  surgery.    CXR done . No acute process.   She is on 3 lit  O2 via NC . With sleep and during daytime  She is using nebulizer albuterol and ipatropium QID prn ,   Combivent respimat QID , albuterol HFA.  She is still smoking less than 3/4 ppd.  C/o shortness of breath with exertion .   C/o cough with clear white  mucus in morning .  C/o Wheezing , with exertion ans hear when lie down.   No Chest tightness.   No Chest pain with radiation  or pleuritic pain.  No  leg edema.   No orthopnea.  No Fever or chills.   No Rhinorrhea and postnasal drip.     FVC is 1.23 liters which is 49 % of predicted, FEV1 is 0.89 liters which is 47 % of predicted, FEV1/FVC ratio is 72% which is mildly decreased Flow volume loop suggest an obstructive pattern, FEF 25-75% is 0.52 L/Sec, which is 32 % of predicted. Following bronchodilator therapy forced vital capacity improved by 13%, FEV1 improved by 10%, FEF 25-75% improved by 57%    Allergies:  Patient has no known allergies.  Past Medical History:   Diagnosis Date    Chronic bronchitis (HCC)     COPD (chronic obstructive pulmonary disease) (HCC)     Lung disease      No past surgical history on file.  Family History   Problem Relation Age of Onset    Brain Cancer Father      Social History     Socioeconomic History    Marital status: Single     Spouse name: Not on file    Number of children: Not on file    Years of education: Not on file    Highest education level: Not on file   Occupational History    Not on file   Tobacco Use    Smoking status: Every Day     Current packs/day: 1.00     Average packs/day: 1 pack/day for

## 2024-05-29 DIAGNOSIS — J44.1 COPD WITH EXACERBATION (HCC): ICD-10-CM

## 2024-05-29 RX ORDER — PREDNISONE 10 MG/1
TABLET ORAL
Qty: 40 TABLET | Refills: 0 | Status: SHIPPED | OUTPATIENT
Start: 2024-05-29

## 2024-05-29 RX ORDER — AZITHROMYCIN 250 MG/1
250 TABLET, FILM COATED ORAL SEE ADMIN INSTRUCTIONS
Qty: 6 TABLET | Refills: 1 | Status: SHIPPED | OUTPATIENT
Start: 2024-05-29 | End: 2024-06-03

## 2024-05-29 NOTE — TELEPHONE ENCOUNTER
PT HAVING A COPD FLARE UP      Rx requested:  Requested Prescriptions     Pending Prescriptions Disp Refills    predniSONE (DELTASONE) 10 MG tablet 40 tablet 0     Si tablets daily for 4 days, 3 tablets daily for 4 days, 2 tablets daily for 4 days, then 1 tablet daily for 4 days    azithromycin (ZITHROMAX) 250 MG tablet 6 tablet 1     Sig: Take 1 tablet by mouth See Admin Instructions for 5 days 500mg on day 1 followed by 250mg on days 2 - 5       Last Office Visit:   3/21/2024      Next Visit Date:  Future Appointments   Date Time Provider Department Center   2024 10:00 AM Stanley Mas MD [facility]

## 2024-05-29 NOTE — TELEPHONE ENCOUNTER
Rx requested:  Requested Prescriptions     Pending Prescriptions Disp Refills    albuterol-ipratropium (COMBIVENT RESPIMAT)  MCG/ACT AERS inhaler 3 each 3     Sig: INHALE 1 INHALATION BY MOUTH  EVERY 6 HOURS       Last Office Visit:   3/21/2024      Next Visit Date:  Future Appointments   Date Time Provider Department Center   7/24/2024 10:00 AM Stanley Mas MD [facility]

## 2024-06-25 ENCOUNTER — APPOINTMENT (OUTPATIENT)
Dept: RADIOLOGY | Facility: HOSPITAL | Age: 74
End: 2024-06-25
Payer: MEDICARE

## 2024-06-25 ENCOUNTER — APPOINTMENT (OUTPATIENT)
Dept: CARDIOLOGY | Facility: HOSPITAL | Age: 74
End: 2024-06-25
Payer: MEDICARE

## 2024-06-25 ENCOUNTER — HOSPITAL ENCOUNTER (EMERGENCY)
Facility: HOSPITAL | Age: 74
Discharge: HOME | End: 2024-06-25
Attending: EMERGENCY MEDICINE
Payer: MEDICARE

## 2024-06-25 VITALS
HEART RATE: 104 BPM | BODY MASS INDEX: 28.07 KG/M2 | WEIGHT: 143 LBS | OXYGEN SATURATION: 98 % | TEMPERATURE: 98.1 F | HEIGHT: 60 IN | DIASTOLIC BLOOD PRESSURE: 80 MMHG | RESPIRATION RATE: 20 BRPM | SYSTOLIC BLOOD PRESSURE: 141 MMHG

## 2024-06-25 DIAGNOSIS — G51.0 BELL'S PALSY: Primary | ICD-10-CM

## 2024-06-25 LAB
ALBUMIN SERPL BCP-MCNC: 4.4 G/DL (ref 3.4–5)
ALP SERPL-CCNC: 70 U/L (ref 33–136)
ALT SERPL W P-5'-P-CCNC: 8 U/L (ref 7–45)
ANION GAP SERPL CALC-SCNC: 12 MMOL/L (ref 10–20)
APTT PPP: 35 SECONDS (ref 27–38)
AST SERPL W P-5'-P-CCNC: 13 U/L (ref 9–39)
BASOPHILS # BLD AUTO: 0.07 X10*3/UL (ref 0–0.1)
BASOPHILS NFR BLD AUTO: 0.7 %
BILIRUB SERPL-MCNC: 0.4 MG/DL (ref 0–1.2)
BUN SERPL-MCNC: 14 MG/DL (ref 6–23)
CALCIUM SERPL-MCNC: 10 MG/DL (ref 8.6–10.3)
CARDIAC TROPONIN I PNL SERPL HS: 10 NG/L (ref 0–13)
CHLORIDE SERPL-SCNC: 101 MMOL/L (ref 98–107)
CO2 SERPL-SCNC: 32 MMOL/L (ref 21–32)
CREAT SERPL-MCNC: 1 MG/DL (ref 0.5–1.05)
EGFRCR SERPLBLD CKD-EPI 2021: 59 ML/MIN/1.73M*2
EOSINOPHIL # BLD AUTO: 0.26 X10*3/UL (ref 0–0.4)
EOSINOPHIL NFR BLD AUTO: 2.5 %
ERYTHROCYTE [DISTWIDTH] IN BLOOD BY AUTOMATED COUNT: 13.3 % (ref 11.5–14.5)
GLUCOSE SERPL-MCNC: 181 MG/DL (ref 74–99)
HCT VFR BLD AUTO: 50.4 % (ref 36–46)
HGB BLD-MCNC: 16.2 G/DL (ref 12–16)
HOLD SPECIMEN: NORMAL
HOLD SPECIMEN: NORMAL
IMM GRANULOCYTES # BLD AUTO: 0.04 X10*3/UL (ref 0–0.5)
IMM GRANULOCYTES NFR BLD AUTO: 0.4 % (ref 0–0.9)
INR PPP: 0.9 (ref 0.9–1.1)
LYMPHOCYTES # BLD AUTO: 2.43 X10*3/UL (ref 0.8–3)
LYMPHOCYTES NFR BLD AUTO: 23.4 %
MCH RBC QN AUTO: 29.5 PG (ref 26–34)
MCHC RBC AUTO-ENTMCNC: 32.1 G/DL (ref 32–36)
MCV RBC AUTO: 92 FL (ref 80–100)
MONOCYTES # BLD AUTO: 1.13 X10*3/UL (ref 0.05–0.8)
MONOCYTES NFR BLD AUTO: 10.9 %
NEUTROPHILS # BLD AUTO: 6.44 X10*3/UL (ref 1.6–5.5)
NEUTROPHILS NFR BLD AUTO: 62.1 %
NRBC BLD-RTO: 0 /100 WBCS (ref 0–0)
PLATELET # BLD AUTO: 371 X10*3/UL (ref 150–450)
POTASSIUM SERPL-SCNC: 3.9 MMOL/L (ref 3.5–5.3)
PROT SERPL-MCNC: 7.7 G/DL (ref 6.4–8.2)
PROTHROMBIN TIME: 10.6 SECONDS (ref 9.8–12.8)
RBC # BLD AUTO: 5.49 X10*6/UL (ref 4–5.2)
SODIUM SERPL-SCNC: 141 MMOL/L (ref 136–145)
WBC # BLD AUTO: 10.4 X10*3/UL (ref 4.4–11.3)

## 2024-06-25 PROCEDURE — 80053 COMPREHEN METABOLIC PANEL: CPT | Performed by: PHYSICIAN ASSISTANT

## 2024-06-25 PROCEDURE — 85025 COMPLETE CBC W/AUTO DIFF WBC: CPT | Performed by: PHYSICIAN ASSISTANT

## 2024-06-25 PROCEDURE — 84484 ASSAY OF TROPONIN QUANT: CPT | Performed by: PHYSICIAN ASSISTANT

## 2024-06-25 PROCEDURE — 85610 PROTHROMBIN TIME: CPT | Performed by: PHYSICIAN ASSISTANT

## 2024-06-25 PROCEDURE — 36415 COLL VENOUS BLD VENIPUNCTURE: CPT | Performed by: PHYSICIAN ASSISTANT

## 2024-06-25 PROCEDURE — 85730 THROMBOPLASTIN TIME PARTIAL: CPT | Performed by: PHYSICIAN ASSISTANT

## 2024-06-25 PROCEDURE — 2500000004 HC RX 250 GENERAL PHARMACY W/ HCPCS (ALT 636 FOR OP/ED): Performed by: PHYSICIAN ASSISTANT

## 2024-06-25 PROCEDURE — 2500000001 HC RX 250 WO HCPCS SELF ADMINISTERED DRUGS (ALT 637 FOR MEDICARE OP): Performed by: PHYSICIAN ASSISTANT

## 2024-06-25 PROCEDURE — 93005 ELECTROCARDIOGRAM TRACING: CPT

## 2024-06-25 PROCEDURE — 70450 CT HEAD/BRAIN W/O DYE: CPT

## 2024-06-25 PROCEDURE — 99285 EMERGENCY DEPT VISIT HI MDM: CPT

## 2024-06-25 PROCEDURE — 70450 CT HEAD/BRAIN W/O DYE: CPT | Performed by: STUDENT IN AN ORGANIZED HEALTH CARE EDUCATION/TRAINING PROGRAM

## 2024-06-25 RX ORDER — PREDNISONE 20 MG/1
60 TABLET ORAL ONCE
Status: COMPLETED | OUTPATIENT
Start: 2024-06-25 | End: 2024-06-25

## 2024-06-25 RX ORDER — BACITRACIN ZINC AND POLYMYXIN B SULFATE 500; 10000 [USP'U]/G; [USP'U]/G
OINTMENT OPHTHALMIC EVERY 12 HOURS
Qty: 3.5 G | Refills: 0 | Status: SHIPPED | OUTPATIENT
Start: 2024-06-25 | End: 2024-07-05

## 2024-06-25 RX ORDER — BACITRACIN 500 [USP'U]/G
0.5 OINTMENT OPHTHALMIC ONCE
Status: DISCONTINUED | OUTPATIENT
Start: 2024-06-25 | End: 2024-06-25

## 2024-06-25 RX ORDER — ERYTHROMYCIN 5 MG/G
1 OINTMENT OPHTHALMIC ONCE
Status: COMPLETED | OUTPATIENT
Start: 2024-06-25 | End: 2024-06-25

## 2024-06-25 RX ORDER — PREDNISONE 20 MG/1
60 TABLET ORAL DAILY
Qty: 21 TABLET | Refills: 0 | Status: SHIPPED | OUTPATIENT
Start: 2024-06-25 | End: 2024-07-02

## 2024-06-25 ASSESSMENT — LIFESTYLE VARIABLES
EVER FELT BAD OR GUILTY ABOUT YOUR DRINKING: NO
TOTAL SCORE: 0
HAVE YOU EVER FELT YOU SHOULD CUT DOWN ON YOUR DRINKING: NO
HAVE PEOPLE ANNOYED YOU BY CRITICIZING YOUR DRINKING: NO
EVER HAD A DRINK FIRST THING IN THE MORNING TO STEADY YOUR NERVES TO GET RID OF A HANGOVER: NO

## 2024-06-25 ASSESSMENT — PAIN - FUNCTIONAL ASSESSMENT: PAIN_FUNCTIONAL_ASSESSMENT: 0-10

## 2024-06-25 ASSESSMENT — PAIN SCALES - GENERAL
PAINLEVEL_OUTOF10: 0 - NO PAIN
PAINLEVEL_OUTOF10: 0 - NO PAIN

## 2024-06-25 NOTE — ED PROVIDER NOTES
HPI   No chief complaint on file.      This is a 74-year-old female who woke up this morning around 10:00 and noticed that she had right-sided facial droop and the inability to close her right eye.  She denies any headache, dizziness, chest pain, palpitations, near-syncope, fevers, chills, diaphoresis, nausea, vomiting or diarrhea.  She denies any numbness, tingling or weakness to her extremities.  Patient denies any recent illnesses.  I did review the patient's EMR which shows she has a history of COPD, chronic bronchitis, infrarenal abdominal aortic aneurysm, GERD, osteoarthrosis of the lumbar spine left knee, status post .  Patient was brought into the hospital by family members.        History provided by:  Patient and medical records                      Stowell Coma Scale Score: 15         NIH Stroke Scale: 2             Patient History   No past medical history on file.  No past surgical history on file.  No family history on file.  Social History     Tobacco Use   • Smoking status: Not on file   • Smokeless tobacco: Not on file   Substance Use Topics   • Alcohol use: Not on file   • Drug use: Not on file       Physical Exam   ED Triage Vitals   Temp Pulse Resp BP   -- -- -- --      SpO2 Temp src Heart Rate Source Patient Position   -- -- -- --      BP Location FiO2 (%)     -- --       Physical Exam  Vitals and nursing note reviewed. Exam conducted with a chaperone present.   Constitutional:       General: She is awake. She is not in acute distress.     Appearance: Normal appearance. She is well-developed, well-groomed and normal weight. She is not ill-appearing, toxic-appearing or diaphoretic.   HENT:      Head: Normocephalic and atraumatic.      Comments: Patient has obvious right-sided facial droop with flattening of the nasolabial fold and decreased wrinkles above the right eyebrow.  No rashes noted     Right Ear: Hearing, tympanic membrane, ear canal and external ear normal.      Left Ear:  Hearing, tympanic membrane, ear canal and external ear normal.      Nose: Nose normal.      Mouth/Throat:      Lips: Pink.      Mouth: Mucous membranes are moist.      Pharynx: Oropharynx is clear. Uvula midline.   Eyes:      Extraocular Movements: Extraocular movements intact.      Conjunctiva/sclera: Conjunctivae normal.      Pupils: Pupils are equal, round, and reactive to light.   Cardiovascular:      Rate and Rhythm: Normal rate and regular rhythm.      Pulses: Normal pulses.      Heart sounds: Normal heart sounds.   Pulmonary:      Effort: Pulmonary effort is normal.      Breath sounds: Normal breath sounds. No wheezing, rhonchi or rales.   Abdominal:      General: Abdomen is flat. Bowel sounds are normal.      Palpations: Abdomen is soft. There is no mass.      Tenderness: There is no abdominal tenderness. There is no guarding.   Musculoskeletal:         General: No swelling or tenderness. Normal range of motion.      Cervical back: Normal range of motion and neck supple.   Skin:     General: Skin is warm and dry.      Capillary Refill: Capillary refill takes less than 2 seconds.      Findings: No rash.   Neurological:      General: No focal deficit present.      Mental Status: She is alert and oriented to person, place, and time. Mental status is at baseline.      GCS: GCS eye subscore is 4. GCS verbal subscore is 5. GCS motor subscore is 6.      Cranial Nerves: Facial asymmetry present.      Sensory: Sensation is intact.      Motor: Motor function is intact.      Coordination: Coordination is intact.      Comments: Patient's neurological exam shows right-sided facial droop including flattening of the nasolabial fold and inability to close right eye or raise the right eyebrow suspicious for Bell's palsy.  Remainder of her neurological exam is unremarkable.   Psychiatric:         Mood and Affect: Mood normal.         Behavior: Behavior normal. Behavior is cooperative.         Thought Content: Thought content  normal.         Judgment: Judgment normal.         ED Course & MDM   Diagnoses as of 06/25/24 1951   Bell's palsy       Medical Decision Making  1833 stroke alert was initiated.  Initial impression is acute Bell's palsy.  Lab work was ordered as well as a stat CT scan of the head noncontrast.  EKG interpreted by me at 1928 hrs. sinus tachycardia with occasional PAC, rate 115  QRS was 76  QTc was 467 no ischemic changes.  Noncontrast CT scan of the head was unremarkable.  Initial blood pressure was 184/109 heart rate 98 respirations 18 pulse ox was 91% on room air temperature 36.7 °C  CBC white count 10.4 hemoglobin 16.2 hematocrit 50.4 platelet count was 371, metabolic glucose 181, GFR 59 otherwise unremarkable.  Her troponin was 10, PT 10.6, INR 0.9, PTT was 35.  On repeat exam the patient still has right-sided facial droop which is consistent with Bell's palsy.  She remains neurologically intact other than the facial droop.  I discussed results of workup with the patient.  She is mentating at her baseline.  I have ordered prednisone 60 mg p.o. and natural tears ophthalmic ointment for the right eye to keep from drying out.  The patient will be discharged home with prescription for prednisone and artificial ophthalmic tears to prevent injury to the patient's right eye.  She was given referral to ophthalmology, neurology and her primary care physician.  She was advised to return with any concerns or worsening of symptoms all questions answered prior to discharge        Procedure  Procedures     [address] PA-C  06/25/24 1951

## 2024-06-29 LAB
ATRIAL RATE: 115 BPM
P AXIS: 81 DEGREES
P OFFSET: 178 MS
P ONSET: 128 MS
PR INTERVAL: 196 MS
Q ONSET: 226 MS
QRS COUNT: 19 BEATS
QRS DURATION: 76 MS
QT INTERVAL: 338 MS
QTC CALCULATION(BAZETT): 467 MS
QTC FREDERICIA: 419 MS
R AXIS: 67 DEGREES
T AXIS: 73 DEGREES
T OFFSET: 395 MS
VENTRICULAR RATE: 115 BPM

## 2024-07-24 ENCOUNTER — OFFICE VISIT (OUTPATIENT)
Dept: PULMONOLOGY | Age: 74
End: 2024-07-24
Payer: MEDICARE

## 2024-07-24 VITALS
SYSTOLIC BLOOD PRESSURE: 132 MMHG | HEART RATE: 91 BPM | BODY MASS INDEX: 29.29 KG/M2 | DIASTOLIC BLOOD PRESSURE: 84 MMHG | WEIGHT: 150 LBS | OXYGEN SATURATION: 97 %

## 2024-07-24 DIAGNOSIS — Z72.0 TOBACCO ABUSE DISORDER: ICD-10-CM

## 2024-07-24 DIAGNOSIS — J44.9 CHRONIC OBSTRUCTIVE PULMONARY DISEASE, UNSPECIFIED COPD TYPE (HCC): Primary | ICD-10-CM

## 2024-07-24 DIAGNOSIS — G51.0 RIGHT-SIDED BELL'S PALSY: ICD-10-CM

## 2024-07-24 DIAGNOSIS — J96.11 CHRONIC RESPIRATORY FAILURE WITH HYPOXIA (HCC): ICD-10-CM

## 2024-07-24 PROCEDURE — G8427 DOCREV CUR MEDS BY ELIG CLIN: HCPCS | Performed by: INTERNAL MEDICINE

## 2024-07-24 PROCEDURE — 3023F SPIROM DOC REV: CPT | Performed by: INTERNAL MEDICINE

## 2024-07-24 PROCEDURE — 4004F PT TOBACCO SCREEN RCVD TLK: CPT | Performed by: INTERNAL MEDICINE

## 2024-07-24 PROCEDURE — G8417 CALC BMI ABV UP PARAM F/U: HCPCS | Performed by: INTERNAL MEDICINE

## 2024-07-24 PROCEDURE — 99214 OFFICE O/P EST MOD 30 MIN: CPT | Performed by: INTERNAL MEDICINE

## 2024-07-24 PROCEDURE — G8400 PT W/DXA NO RESULTS DOC: HCPCS | Performed by: INTERNAL MEDICINE

## 2024-07-24 PROCEDURE — 1123F ACP DISCUSS/DSCN MKR DOCD: CPT | Performed by: INTERNAL MEDICINE

## 2024-07-24 PROCEDURE — 3017F COLORECTAL CA SCREEN DOC REV: CPT | Performed by: INTERNAL MEDICINE

## 2024-07-24 PROCEDURE — 1090F PRES/ABSN URINE INCON ASSESS: CPT | Performed by: INTERNAL MEDICINE

## 2024-07-24 NOTE — PROGRESS NOTES
ipratropium 0.5 mg-albuterol 2.5 mg (DUONEB) 0.5-2.5 (3) MG/3ML SOLN nebulizer solution Inhale 3 mLs into the lungs 4 times daily 360 mL 2    OXYGEN Please give humidifier for Oxygen 1 Units 0     No current facility-administered medications for this visit.       Results for orders placed during the hospital encounter of 03/15/24    XR CHEST (2 VW)    Narrative  EXAMINATION:  TWO XRAY VIEWS OF THE CHEST    3/15/2024 9:34 am    COMPARISON:  None.    HISTORY:  ORDERING SYSTEM PROVIDED HISTORY: COPD with exacerbation (HCC)  TECHNOLOGIST PROVIDED HISTORY:  Reason for exam:->COPD  What reading provider will be dictating this exam?->CRC    FINDINGS:  The lungs are without acute focal process.  There is no effusion or  pneumothorax. The cardiomediastinal silhouette is without acute process. The  osseous structures are without acute process.    Impression  No acute process.      Results for orders placed in visit on 07/29/21    XR CHEST STANDARD (2 VW)    Narrative  DIAGNOSIS:J44.9 Chronic obstructive pulmonary disease, unspecified COPD type (HCC) ICD10    COMPARISON: August 21, 2020    TECHNIQUE: PA and lateral chest    FINDINGS: The lungs are hyperinflated and there is coarsening of the interstitium. Flattening of the diaphragm and increased AP diameter is seen. The lungs contain no focal infiltrate, pleural effusion, consolidation or pneumothorax. The cardiac  silhouette is not enlarged. Calcifications are seen in the thoracic aorta. There are degenerative changes in the dorsal spine.    Impression  No acute cardiopulmonary process, findings are suggestive of COPD      Results for orders placed during the hospital encounter of 08/20/20    XR CHEST (2 VW)    Narrative  X-RAY: A CHEST, 2 VIEW(S):    CLINICAL HISTORY:  J44.9 Chronic obstructive pulmonary disease, unspecified COPD type (HCC) ICD10  , no complaints today.    DATE: 8/20/2020 8:10 AM    COMPARISONS: 8/24/2019    FINDINGS: Normal cardiac silhouette. There are

## 2024-09-11 ENCOUNTER — TELEPHONE (OUTPATIENT)
Dept: PULMONOLOGY | Age: 74
End: 2024-09-11

## 2024-09-11 DIAGNOSIS — J44.1 COPD WITH EXACERBATION (HCC): Primary | ICD-10-CM

## 2024-09-11 RX ORDER — DOXYCYCLINE HYCLATE 100 MG
100 TABLET ORAL 2 TIMES DAILY
Qty: 20 TABLET | Refills: 0 | Status: SHIPPED | OUTPATIENT
Start: 2024-09-11 | End: 2024-09-13 | Stop reason: SDUPTHER

## 2024-09-11 RX ORDER — PREDNISONE 10 MG/1
TABLET ORAL
Qty: 40 TABLET | Refills: 0 | Status: SHIPPED | OUTPATIENT
Start: 2024-09-11 | End: 2024-09-13 | Stop reason: SDUPTHER

## 2024-09-13 DIAGNOSIS — J44.1 COPD WITH EXACERBATION (HCC): ICD-10-CM

## 2024-09-13 DIAGNOSIS — J44.9 CHRONIC OBSTRUCTIVE PULMONARY DISEASE, UNSPECIFIED COPD TYPE (HCC): ICD-10-CM

## 2024-09-13 RX ORDER — PREDNISONE 10 MG/1
TABLET ORAL
Qty: 40 TABLET | Refills: 0 | Status: SHIPPED | OUTPATIENT
Start: 2024-09-13

## 2024-09-13 RX ORDER — DOXYCYCLINE HYCLATE 100 MG
100 TABLET ORAL 2 TIMES DAILY
Qty: 20 TABLET | Refills: 0 | Status: SHIPPED | OUTPATIENT
Start: 2024-09-13 | End: 2024-09-23

## 2024-10-18 ENCOUNTER — TELEPHONE (OUTPATIENT)
Dept: PULMONOLOGY | Age: 74
End: 2024-10-18

## 2024-10-18 DIAGNOSIS — J44.1 COPD WITH EXACERBATION (HCC): Primary | ICD-10-CM

## 2024-10-18 RX ORDER — DOXYCYCLINE HYCLATE 100 MG
100 TABLET ORAL 2 TIMES DAILY
Qty: 20 TABLET | Refills: 0 | Status: SHIPPED | OUTPATIENT
Start: 2024-10-18 | End: 2024-10-28

## 2024-10-18 RX ORDER — PREDNISONE 10 MG/1
TABLET ORAL
Qty: 40 TABLET | Refills: 0 | Status: SHIPPED | OUTPATIENT
Start: 2024-10-18

## 2024-10-18 NOTE — TELEPHONE ENCOUNTER
Patient called - stating she has a COPD flare-up    She is requesting antibiotic and prednisone sent to [facility] in [place]

## 2024-10-24 ENCOUNTER — TELEPHONE (OUTPATIENT)
Dept: PULMONOLOGY | Age: 74
End: 2024-10-24

## 2024-10-24 DIAGNOSIS — J44.1 COPD WITH EXACERBATION (HCC): ICD-10-CM

## 2024-10-24 RX ORDER — DOXYCYCLINE HYCLATE 100 MG
100 TABLET ORAL 2 TIMES DAILY
Qty: 20 TABLET | Refills: 0 | Status: SHIPPED | OUTPATIENT
Start: 2024-10-24 | End: 2024-11-03

## 2024-10-24 RX ORDER — PREDNISONE 10 MG/1
TABLET ORAL
Qty: 40 TABLET | Refills: 0 | Status: SHIPPED | OUTPATIENT
Start: 2024-10-24

## 2024-10-24 NOTE — TELEPHONE ENCOUNTER
PATIENT [facility]

## 2024-12-17 ENCOUNTER — TELEMEDICINE (OUTPATIENT)
Dept: PULMONOLOGY | Age: 74
End: 2024-12-17
Payer: MEDICARE

## 2024-12-17 DIAGNOSIS — Z72.0 TOBACCO ABUSE DISORDER: ICD-10-CM

## 2024-12-17 DIAGNOSIS — J96.11 CHRONIC RESPIRATORY FAILURE WITH HYPOXIA: Primary | ICD-10-CM

## 2024-12-17 DIAGNOSIS — J44.1 COPD WITH EXACERBATION (HCC): ICD-10-CM

## 2024-12-17 DIAGNOSIS — G51.0 RIGHT-SIDED BELL'S PALSY: ICD-10-CM

## 2024-12-17 PROCEDURE — 99443 PR PHYS/QHP TELEPHONE EVALUATION 21-30 MIN: CPT | Performed by: INTERNAL MEDICINE

## 2024-12-17 PROCEDURE — 1159F MED LIST DOCD IN RCRD: CPT | Performed by: INTERNAL MEDICINE

## 2024-12-17 RX ORDER — ALBUTEROL SULFATE 90 UG/1
INHALANT RESPIRATORY (INHALATION)
Qty: 34 G | Refills: 3 | Status: SHIPPED | OUTPATIENT
Start: 2024-12-17 | End: 2024-12-18 | Stop reason: SDUPTHER

## 2024-12-17 RX ORDER — PREDNISONE 10 MG/1
TABLET ORAL
Qty: 40 TABLET | Refills: 0 | Status: SHIPPED | OUTPATIENT
Start: 2024-12-17

## 2024-12-17 NOTE — PROGRESS NOTES
Rosemary Campbell, was evaluated through a synchronous (real-time) telephone encounter. The patient (or guardian if applicable) is aware that this is a billable service, which includes applicable co-pays. This Virtual Visit was conducted with patient's (and/or legal guardian's) consent. Patient identification was verified, and a caregiver was present when appropriate.   The patient was located at Home: [address] [place] [id number]  Provider was located at Facility (Appt Dept): [address] [place] [address]  [address] [place], [place] [id number]  Yes, I confirm.         Subjective     HPI  She had bells palsy on rt. Side on 6/25/24 , improved.She is doing better.   She is on 4 lit  O2 via NC . With sleep and during daytime  She is using nebulizer albuterol and ipatropium QID prn ,   Combivent respimat QID , albuterol HFA.  She is still smoking less than 3/4 ppd.  C/o shortness of breath with exertion . C/o cough with clear white  mucus in morning .  C/o Wheezing off and on   She has some redness and swelling rt. External EAR.  No Chest tightness. No Chest pain with radiation  or pleuritic pain.  No  leg edema. No orthopnea.No Fever or chills.   No Rhinorrhea and postnasal drip.     PFT   FVC is 1.23 liters which is 49 % of predicted, FEV1 is 0.89 liters which is 47 % of predicted, FEV1/FVC ratio is 72% which is mildly decreased Flow volume loop suggest an obstructive pattern, FEF 25-75% is 0.52 L/Sec, which is 32 % of predicted. Following bronchodilator therapy forced vital capacity improved by 13%, FEV1 improved by 10%, FEF 25-75% improved by 57%    Current Outpatient Medications on File Prior to Visit   Medication Sig Dispense Refill    metoprolol succinate (TOPROL XL) 25 MG extended release tablet Take 1 tablet by mouth daily      rosuvastatin (CRESTOR) 20 MG tablet Take 1 tablet by mouth nightly      ipratropium 0.5 mg-albuterol 2.5 mg (DUONEB) 0.5-2.5 (3) MG/3ML SOLN nebulizer solution Inhale 3 mLs into the lungs 4

## 2024-12-18 DIAGNOSIS — J44.1 COPD WITH EXACERBATION (HCC): ICD-10-CM

## 2024-12-18 RX ORDER — ALBUTEROL SULFATE 90 UG/1
INHALANT RESPIRATORY (INHALATION)
Qty: 34 G | Refills: 3 | Status: SHIPPED | OUTPATIENT
Start: 2024-12-18

## 2024-12-18 NOTE — TELEPHONE ENCOUNTER
Pt needs rx to go to mail pharmacy instead of local.      Rx requested:  Requested Prescriptions     Pending Prescriptions Disp Refills    albuterol-ipratropium (COMBIVENT RESPIMAT)  MCG/ACT AERS inhaler 3 each 3     Sig: INHALE 1 INHALATION BY MOUTH  EVERY 6 HOURS    albuterol sulfate HFA (PROVENTIL;VENTOLIN;PROAIR) 108 (90 Base) MCG/ACT inhaler 34 g 3     Sig: USE 2 INHALATIONS BY MOUTH 4  TIMES DAILY AS NEEDED FOR  WHEEZING       Last Office Visit:   12/17/2024      Next Visit Date:  Future Appointments   Date Time Provider Department Center   4/17/2025 10:45 AM Stanley Mas MD [facility]

## 2024-12-24 ASSESSMENT — ENCOUNTER SYMPTOMS
WHEEZING: 1
DIARRHEA: 0
EYE DISCHARGE: 0
NAUSEA: 0
RHINORRHEA: 0
SHORTNESS OF BREATH: 1
CHEST TIGHTNESS: 0
ABDOMINAL PAIN: 0
VOMITING: 0
EYE ITCHING: 0
TROUBLE SWALLOWING: 0
VOICE CHANGE: 0
SINUS PRESSURE: 0
COUGH: 1
SORE THROAT: 0